# Patient Record
Sex: MALE | Race: WHITE | NOT HISPANIC OR LATINO | Employment: OTHER | URBAN - METROPOLITAN AREA
[De-identification: names, ages, dates, MRNs, and addresses within clinical notes are randomized per-mention and may not be internally consistent; named-entity substitution may affect disease eponyms.]

---

## 2017-07-27 DIAGNOSIS — E78.1 HYPERTRIGLYCERIDEMIA: ICD-10-CM

## 2017-07-27 DIAGNOSIS — E78.5 HYPERLIPIDEMIA: ICD-10-CM

## 2017-07-27 RX ORDER — SIMVASTATIN 80 MG/1
80 TABLET, FILM COATED ORAL NIGHTLY
Qty: 90 TABLET | Refills: 0 | Status: SHIPPED | OUTPATIENT
Start: 2017-07-27 | End: 2017-08-10 | Stop reason: SDUPTHER

## 2017-08-09 DIAGNOSIS — I10 ESSENTIAL HYPERTENSION: ICD-10-CM

## 2017-08-09 RX ORDER — ATENOLOL 50 MG/1
TABLET ORAL
Qty: 90 TABLET | OUTPATIENT
Start: 2017-08-09

## 2017-08-10 ENCOUNTER — OFFICE VISIT (OUTPATIENT)
Dept: FAMILY MEDICINE | Facility: CLINIC | Age: 52
End: 2017-08-10
Payer: COMMERCIAL

## 2017-08-10 ENCOUNTER — LAB VISIT (OUTPATIENT)
Dept: LAB | Facility: HOSPITAL | Age: 52
End: 2017-08-10
Attending: FAMILY MEDICINE
Payer: COMMERCIAL

## 2017-08-10 VITALS
HEART RATE: 75 BPM | SYSTOLIC BLOOD PRESSURE: 124 MMHG | BODY MASS INDEX: 31.02 KG/M2 | WEIGHT: 209.44 LBS | TEMPERATURE: 97 F | DIASTOLIC BLOOD PRESSURE: 78 MMHG | OXYGEN SATURATION: 98 % | HEIGHT: 69 IN

## 2017-08-10 DIAGNOSIS — I10 ESSENTIAL HYPERTENSION: ICD-10-CM

## 2017-08-10 DIAGNOSIS — Z00.00 ANNUAL PHYSICAL EXAM: Primary | ICD-10-CM

## 2017-08-10 DIAGNOSIS — E78.5 HYPERLIPIDEMIA, UNSPECIFIED HYPERLIPIDEMIA TYPE: ICD-10-CM

## 2017-08-10 DIAGNOSIS — E78.1 HYPERTRIGLYCERIDEMIA: ICD-10-CM

## 2017-08-10 DIAGNOSIS — Z00.00 ANNUAL PHYSICAL EXAM: ICD-10-CM

## 2017-08-10 DIAGNOSIS — G89.29 CHRONIC PAIN OF RIGHT KNEE: ICD-10-CM

## 2017-08-10 DIAGNOSIS — M25.561 CHRONIC PAIN OF RIGHT KNEE: ICD-10-CM

## 2017-08-10 PROBLEM — M25.562 CHRONIC PAIN OF LEFT KNEE: Status: ACTIVE | Noted: 2017-08-10

## 2017-08-10 LAB
ALBUMIN SERPL BCP-MCNC: 4.4 G/DL
ALP SERPL-CCNC: 70 U/L
ALT SERPL W/O P-5'-P-CCNC: 28 U/L
ANION GAP SERPL CALC-SCNC: 10 MMOL/L
AST SERPL-CCNC: 22 U/L
BASOPHILS # BLD AUTO: 0.04 K/UL
BASOPHILS NFR BLD: 0.7 %
BILIRUB SERPL-MCNC: 0.7 MG/DL
BUN SERPL-MCNC: 15 MG/DL
CALCIUM SERPL-MCNC: 10 MG/DL
CHLORIDE SERPL-SCNC: 108 MMOL/L
CHOLEST/HDLC SERPL: 3.9 {RATIO}
CO2 SERPL-SCNC: 25 MMOL/L
CREAT SERPL-MCNC: 1 MG/DL
CRP SERPL-MCNC: 0.7 MG/L
DIFFERENTIAL METHOD: ABNORMAL
EOSINOPHIL # BLD AUTO: 0.4 K/UL
EOSINOPHIL NFR BLD: 6.3 %
ERYTHROCYTE [DISTWIDTH] IN BLOOD BY AUTOMATED COUNT: 12.8 %
ERYTHROCYTE [SEDIMENTATION RATE] IN BLOOD BY WESTERGREN METHOD: 4 MM/HR
EST. GFR  (AFRICAN AMERICAN): >60 ML/MIN/1.73 M^2
EST. GFR  (NON AFRICAN AMERICAN): >60 ML/MIN/1.73 M^2
ESTIMATED AVG GLUCOSE: 120 MG/DL
GLUCOSE SERPL-MCNC: 123 MG/DL
HBA1C MFR BLD HPLC: 5.8 %
HCT VFR BLD AUTO: 45.1 %
HDL/CHOLESTEROL RATIO: 25.6 %
HDLC SERPL-MCNC: 156 MG/DL
HDLC SERPL-MCNC: 40 MG/DL
HGB BLD-MCNC: 15 G/DL
LDLC SERPL CALC-MCNC: 71.4 MG/DL
LYMPHOCYTES # BLD AUTO: 2 K/UL
LYMPHOCYTES NFR BLD: 35.3 %
MCH RBC QN AUTO: 31.6 PG
MCHC RBC AUTO-ENTMCNC: 33.3 G/DL
MCV RBC AUTO: 95 FL
MONOCYTES # BLD AUTO: 0.5 K/UL
MONOCYTES NFR BLD: 9.2 %
NEUTROPHILS # BLD AUTO: 2.7 K/UL
NEUTROPHILS NFR BLD: 48.1 %
NONHDLC SERPL-MCNC: 116 MG/DL
PLATELET # BLD AUTO: 251 K/UL
PMV BLD AUTO: 10.5 FL
POTASSIUM SERPL-SCNC: 5 MMOL/L
PROT SERPL-MCNC: 8.2 G/DL
RBC # BLD AUTO: 4.75 M/UL
SODIUM SERPL-SCNC: 143 MMOL/L
TRIGL SERPL-MCNC: 223 MG/DL
TSH SERPL DL<=0.005 MIU/L-ACNC: 2.9 UIU/ML
URATE SERPL-MCNC: 5.7 MG/DL
WBC # BLD AUTO: 5.53 K/UL

## 2017-08-10 PROCEDURE — 85651 RBC SED RATE NONAUTOMATED: CPT

## 2017-08-10 PROCEDURE — 99396 PREV VISIT EST AGE 40-64: CPT | Mod: S$GLB,,, | Performed by: FAMILY MEDICINE

## 2017-08-10 PROCEDURE — 85025 COMPLETE CBC W/AUTO DIFF WBC: CPT

## 2017-08-10 PROCEDURE — 84550 ASSAY OF BLOOD/URIC ACID: CPT

## 2017-08-10 PROCEDURE — 84443 ASSAY THYROID STIM HORMONE: CPT

## 2017-08-10 PROCEDURE — 84270 ASSAY OF SEX HORMONE GLOBUL: CPT

## 2017-08-10 PROCEDURE — 80053 COMPREHEN METABOLIC PANEL: CPT

## 2017-08-10 PROCEDURE — 83036 HEMOGLOBIN GLYCOSYLATED A1C: CPT

## 2017-08-10 PROCEDURE — 80061 LIPID PANEL: CPT

## 2017-08-10 PROCEDURE — 36415 COLL VENOUS BLD VENIPUNCTURE: CPT

## 2017-08-10 PROCEDURE — 99999 PR PBB SHADOW E&M-EST. PATIENT-LVL III: CPT | Mod: PBBFAC,,, | Performed by: FAMILY MEDICINE

## 2017-08-10 PROCEDURE — 86140 C-REACTIVE PROTEIN: CPT

## 2017-08-10 RX ORDER — ATENOLOL 50 MG/1
50 TABLET ORAL DAILY
Qty: 90 TABLET | Refills: 3 | Status: SHIPPED | OUTPATIENT
Start: 2017-08-10 | End: 2018-07-11 | Stop reason: SDUPTHER

## 2017-08-10 RX ORDER — DICLOFENAC SODIUM 10 MG/G
2 GEL TOPICAL 4 TIMES DAILY
Qty: 300 G | Refills: 3 | Status: SHIPPED | OUTPATIENT
Start: 2017-08-10 | End: 2019-12-12 | Stop reason: SDUPTHER

## 2017-08-10 RX ORDER — SIMVASTATIN 80 MG/1
80 TABLET, FILM COATED ORAL NIGHTLY
Qty: 90 TABLET | Refills: 3 | Status: SHIPPED | OUTPATIENT
Start: 2017-08-10 | End: 2018-01-25 | Stop reason: RX

## 2017-08-10 RX ORDER — MELOXICAM 7.5 MG/1
7.5 TABLET ORAL DAILY
Qty: 90 TABLET | Refills: 3 | Status: SHIPPED | OUTPATIENT
Start: 2017-08-10 | End: 2019-12-12 | Stop reason: SDUPTHER

## 2017-08-10 NOTE — PROGRESS NOTES
"Chief Complaint   Patient presents with    Annual Exam     SUBJECTIVE:   Familia Mccarthy is a 51 y.o. male presenting for his annual checkup.  Current Outpatient Prescriptions   Medication Sig Dispense Refill    atenolol (TENORMIN) 50 MG tablet Take 1 tablet (50 mg total) by mouth once daily. 90 tablet 3    simvastatin (ZOCOR) 80 MG tablet Take 1 tablet (80 mg total) by mouth nightly. *DO NOT TAKE WITH GRAPEFRUIT JUICE*-(last fill,needs appointment) 90 tablet 3     No current facility-administered medications for this visit.      Allergies: Review of patient's allergies indicates no known allergies.     ROS:  Feeling well. No dyspnea or chest pain on exertion. No abdominal pain, change in bowel habits, black or bloody stools. No urinary tract or prostatic symptoms. No neurological complaints.    OBJECTIVE:   The patient appears well, alert, oriented x 3, in no distress.   /78   Pulse 75   Temp 96.8 °F (36 °C) (Oral)   Ht 5' 9" (1.753 m)   Wt 95 kg (209 lb 7 oz)   SpO2 98%   BMI 30.93 kg/m²      Wt Readings from Last 15 Encounters:   08/10/17 95 kg (209 lb 7 oz)   07/13/16 94 kg (207 lb 3.7 oz)   06/18/15 93 kg (205 lb)   05/13/15 93 kg (205 lb)   11/19/14 93.4 kg (206 lb)   07/22/14 102.5 kg (226 lb)   07/07/14 102.5 kg (226 lb)   03/28/14 105.7 kg (233 lb)       ENT normal.  Neck supple. No adenopathy or thyromegaly. BILLY. Lungs are clear, good air entry, no wheezes, rhonchi or rales. S1 and S2 normal, no murmurs, regular rate and rhythm. Abdomen is soft without tenderness, guarding, mass or organomegaly.  exam: no penile lesions or discharge, no testicular masses or tenderness, no hernias.  Extremities show no edema, normal peripheral pulses. Neurological is normal without focal findings. Bilateral knees with chronic changes    ASSESSMENT:   1. Annual physical exam    2. Essential hypertension    3. Hypertriglyceridemia    4. Hyperlipidemia, unspecified hyperlipidemia type    5. Chronic pain " of right knee        PLAN:   Familia was seen today for annual exam.    Diagnoses and all orders for this visit:    Annual physical exam  -     CBC auto differential; Future  -     Comprehensive metabolic panel; Future  -     Hemoglobin A1c; Future  -     Lipid panel; Future  -     TSH; Future  -     Urinalysis; Future  -     Uric acid; Future  -     Sedimentation rate, manual; Future  -     C-reactive protein; Future  -     TESTOSTERONE PANEL; Future  Counseled on age appropriate medical preventative services, including age appropriate cancer screenings, over all nutritional health, need for a consistent exercise regimen and an over all push towards maintaining a vigorous and active lifestyle.  Counseled on age appropriate vaccines and discussed upcoming health care needs based on age/gender.  Spent time with patient counseling on need for a good patient/doctor relationship moving forward.  Discussed use of common OTC medications and supplements.  Discussed common dietary aids and use of caffeine and the need for good sleep hygiene and stress management.    Essential hypertension  -     atenolol (TENORMIN) 50 MG tablet; Take 1 tablet (50 mg total) by mouth once daily.  Potential medication side effects were discussed with the patient; let me know if any occur.    Hypertriglyceridemia  -     simvastatin (ZOCOR) 80 MG tablet; Take 1 tablet (80 mg total) by mouth nightly. *DO NOT TAKE WITH GRAPEFRUIT JUICE*-(last fill,needs appointment)    Hyperlipidemia, unspecified hyperlipidemia type  -     simvastatin (ZOCOR) 80 MG tablet; Take 1 tablet (80 mg total) by mouth nightly. *DO NOT TAKE WITH GRAPEFRUIT JUICE*-(last fill,needs appointment)    Chronic pain of right knee  -     meloxicam (MOBIC) 7.5 MG tablet; Take 1 tablet (7.5 mg total) by mouth once daily.  -     diclofenac sodium (VOLTAREN) 1 % Gel; Apply 2 g topically 4 (four) times daily.

## 2017-08-15 LAB
ALBUMIN SERPL-MCNC: 5.2 G/DL (ref 3.6–5.1)
SHBG SERPL-SCNC: 19 NMOL/L (ref 10–50)
TESTOST FREE SERPL-MCNC: 51.7 PG/ML (ref 46–224)
TESTOST SERPL-MCNC: 289 NG/DL (ref 250–1100)
TESTOSTERONE.FREE+WB SERPL-MCNC: 122.2 NG/DL (ref 110–575)

## 2017-09-22 ENCOUNTER — TELEPHONE (OUTPATIENT)
Dept: FAMILY MEDICINE | Facility: CLINIC | Age: 52
End: 2017-09-22

## 2017-09-22 NOTE — TELEPHONE ENCOUNTER
----- Message from Donaldo Duke MD sent at 8/15/2017 11:02 PM CDT -----  Schedule 6 month recall, thank you!

## 2018-01-24 ENCOUNTER — TELEPHONE (OUTPATIENT)
Dept: FAMILY MEDICINE | Facility: CLINIC | Age: 53
End: 2018-01-24

## 2018-01-24 NOTE — TELEPHONE ENCOUNTER
----- Message from Doroteo Regalado sent at 1/24/2018  9:29 AM CST -----  Contact: [facility]  States pt' s new insurance does not cover simvastatin (ZOCOR) 80 MG tablet but it will cover two (2) 40 MG. [facility] can be reached @ [phone].

## 2018-01-25 RX ORDER — SIMVASTATIN 40 MG/1
80 TABLET, FILM COATED ORAL NIGHTLY
Qty: 180 TABLET | Refills: 3 | Status: SHIPPED | OUTPATIENT
Start: 2018-01-25 | End: 2018-07-11 | Stop reason: SDUPTHER

## 2018-02-06 ENCOUNTER — TELEPHONE (OUTPATIENT)
Dept: FAMILY MEDICINE | Facility: CLINIC | Age: 53
End: 2018-02-06

## 2018-02-06 DIAGNOSIS — R79.89 LOW TESTOSTERONE IN MALE: ICD-10-CM

## 2018-02-06 DIAGNOSIS — E78.1 HYPERTRIGLYCERIDEMIA: ICD-10-CM

## 2018-02-06 DIAGNOSIS — R73.03 PREDIABETES: ICD-10-CM

## 2018-02-06 DIAGNOSIS — G89.29 CHRONIC PAIN OF LEFT KNEE: Primary | ICD-10-CM

## 2018-02-06 DIAGNOSIS — M25.562 CHRONIC PAIN OF LEFT KNEE: Primary | ICD-10-CM

## 2018-02-06 DIAGNOSIS — E78.5 HYPERLIPIDEMIA, UNSPECIFIED HYPERLIPIDEMIA TYPE: ICD-10-CM

## 2018-02-06 DIAGNOSIS — I10 HYPERTENSION, UNSPECIFIED TYPE: ICD-10-CM

## 2018-02-06 NOTE — TELEPHONE ENCOUNTER
----- Message from Antionette Dodd sent at 2/6/2018  7:59 AM CST -----  Contact: self  Patient requests to have blood work orders placed for upcoming appointment. He can be reached at [phone]. Thank you!

## 2018-03-09 ENCOUNTER — TELEPHONE (OUTPATIENT)
Dept: FAMILY MEDICINE | Facility: CLINIC | Age: 53
End: 2018-03-09

## 2018-03-09 NOTE — TELEPHONE ENCOUNTER
----- Message from Elizabeth Fischer sent at 3/9/2018  2:52 PM CST -----  Contact: Self [phone]  Patient is requesting a call back to verify if the doctor takes [facility].  From what I see on the list it is only accepted at [facility] location.  I advised patient but he wants to make sure prior to his coming for services starting on 3.16.18.    Patient may be reached at [phone].    Thank you.  LC

## 2018-07-11 DIAGNOSIS — I10 ESSENTIAL HYPERTENSION: ICD-10-CM

## 2018-07-11 RX ORDER — ATENOLOL 50 MG/1
50 TABLET ORAL DAILY
Qty: 90 TABLET | Refills: 3 | Status: SHIPPED | OUTPATIENT
Start: 2018-07-11 | End: 2019-07-15 | Stop reason: SDUPTHER

## 2018-07-11 RX ORDER — SIMVASTATIN 40 MG/1
80 TABLET, FILM COATED ORAL NIGHTLY
Qty: 180 TABLET | Refills: 3 | Status: SHIPPED | OUTPATIENT
Start: 2018-07-11 | End: 2019-02-28

## 2019-01-16 ENCOUNTER — LAB VISIT (OUTPATIENT)
Dept: LAB | Facility: HOSPITAL | Age: 54
End: 2019-01-16
Attending: FAMILY MEDICINE
Payer: COMMERCIAL

## 2019-01-16 DIAGNOSIS — G89.29 CHRONIC PAIN OF LEFT KNEE: ICD-10-CM

## 2019-01-16 DIAGNOSIS — E78.5 HYPERLIPIDEMIA, UNSPECIFIED HYPERLIPIDEMIA TYPE: ICD-10-CM

## 2019-01-16 DIAGNOSIS — M25.562 CHRONIC PAIN OF LEFT KNEE: ICD-10-CM

## 2019-01-16 DIAGNOSIS — R79.89 LOW TESTOSTERONE IN MALE: ICD-10-CM

## 2019-01-16 DIAGNOSIS — I10 HYPERTENSION, UNSPECIFIED TYPE: ICD-10-CM

## 2019-01-16 DIAGNOSIS — R73.03 PREDIABETES: ICD-10-CM

## 2019-01-16 DIAGNOSIS — E78.1 HYPERTRIGLYCERIDEMIA: ICD-10-CM

## 2019-01-16 LAB
ALBUMIN SERPL BCP-MCNC: 4.6 G/DL
ALP SERPL-CCNC: 71 U/L
ALT SERPL W/O P-5'-P-CCNC: 25 U/L
ANION GAP SERPL CALC-SCNC: 8 MMOL/L
AST SERPL-CCNC: 24 U/L
BASOPHILS # BLD AUTO: 0.05 K/UL
BASOPHILS NFR BLD: 0.7 %
BILIRUB SERPL-MCNC: 0.5 MG/DL
BUN SERPL-MCNC: 16 MG/DL
CALCIUM SERPL-MCNC: 9.8 MG/DL
CHLORIDE SERPL-SCNC: 106 MMOL/L
CHOLEST SERPL-MCNC: 137 MG/DL
CHOLEST/HDLC SERPL: 3.8 {RATIO}
CO2 SERPL-SCNC: 27 MMOL/L
CREAT SERPL-MCNC: 1.1 MG/DL
DIFFERENTIAL METHOD: ABNORMAL
EOSINOPHIL # BLD AUTO: 0.5 K/UL
EOSINOPHIL NFR BLD: 6.4 %
ERYTHROCYTE [DISTWIDTH] IN BLOOD BY AUTOMATED COUNT: 12.6 %
EST. GFR  (AFRICAN AMERICAN): >60 ML/MIN/1.73 M^2
EST. GFR  (NON AFRICAN AMERICAN): >60 ML/MIN/1.73 M^2
GLUCOSE SERPL-MCNC: 141 MG/DL
HCT VFR BLD AUTO: 44.4 %
HDLC SERPL-MCNC: 36 MG/DL
HDLC SERPL: 26.3 %
HGB BLD-MCNC: 15.1 G/DL
LDLC SERPL CALC-MCNC: 55.2 MG/DL
LYMPHOCYTES # BLD AUTO: 2.3 K/UL
LYMPHOCYTES NFR BLD: 31.1 %
MCH RBC QN AUTO: 31.8 PG
MCHC RBC AUTO-ENTMCNC: 34 G/DL
MCV RBC AUTO: 94 FL
MONOCYTES # BLD AUTO: 0.6 K/UL
MONOCYTES NFR BLD: 8.2 %
NEUTROPHILS # BLD AUTO: 4 K/UL
NEUTROPHILS NFR BLD: 53.6 %
NONHDLC SERPL-MCNC: 101 MG/DL
PLATELET # BLD AUTO: 237 K/UL
PMV BLD AUTO: 10.1 FL
POTASSIUM SERPL-SCNC: 4.8 MMOL/L
PROT SERPL-MCNC: 7.8 G/DL
RBC # BLD AUTO: 4.75 M/UL
SODIUM SERPL-SCNC: 141 MMOL/L
TRIGL SERPL-MCNC: 229 MG/DL
WBC # BLD AUTO: 7.46 K/UL

## 2019-01-16 PROCEDURE — 80061 LIPID PANEL: CPT

## 2019-01-16 PROCEDURE — 36415 COLL VENOUS BLD VENIPUNCTURE: CPT | Mod: PO

## 2019-01-16 PROCEDURE — 80053 COMPREHEN METABOLIC PANEL: CPT

## 2019-01-16 PROCEDURE — 82040 ASSAY OF SERUM ALBUMIN: CPT

## 2019-01-16 PROCEDURE — 85025 COMPLETE CBC W/AUTO DIFF WBC: CPT

## 2019-01-19 LAB
ALBUMIN SERPL-MCNC: 5.3 G/DL (ref 3.6–5.1)
SHBG SERPL-SCNC: 18 NMOL/L (ref 10–50)
TESTOST FREE SERPL-MCNC: 42.4 PG/ML (ref 46–224)
TESTOST SERPL-MCNC: 235 NG/DL (ref 250–1100)
TESTOSTERONE.FREE+WB SERPL-MCNC: 102.1 NG/DL (ref 110–575)

## 2019-02-01 ENCOUNTER — OFFICE VISIT (OUTPATIENT)
Dept: FAMILY MEDICINE | Facility: CLINIC | Age: 54
End: 2019-02-01
Payer: COMMERCIAL

## 2019-02-01 VITALS
OXYGEN SATURATION: 98 % | BODY MASS INDEX: 31.94 KG/M2 | DIASTOLIC BLOOD PRESSURE: 96 MMHG | HEIGHT: 69 IN | HEART RATE: 74 BPM | SYSTOLIC BLOOD PRESSURE: 130 MMHG | TEMPERATURE: 96 F | WEIGHT: 215.63 LBS

## 2019-02-01 DIAGNOSIS — Z00.00 ANNUAL PHYSICAL EXAM: Primary | ICD-10-CM

## 2019-02-01 DIAGNOSIS — R79.89 LOW TESTOSTERONE IN MALE: ICD-10-CM

## 2019-02-01 DIAGNOSIS — I10 HYPERTENSION, UNSPECIFIED TYPE: ICD-10-CM

## 2019-02-01 DIAGNOSIS — E78.1 HYPERTRIGLYCERIDEMIA: ICD-10-CM

## 2019-02-01 PROCEDURE — 99396 PREV VISIT EST AGE 40-64: CPT | Mod: S$GLB,,, | Performed by: FAMILY MEDICINE

## 2019-02-01 PROCEDURE — 3080F PR MOST RECENT DIASTOLIC BLOOD PRESSURE >= 90 MM HG: ICD-10-PCS | Mod: CPTII,S$GLB,, | Performed by: FAMILY MEDICINE

## 2019-02-01 PROCEDURE — 99999 PR PBB SHADOW E&M-EST. PATIENT-LVL III: ICD-10-PCS | Mod: PBBFAC,,, | Performed by: FAMILY MEDICINE

## 2019-02-01 PROCEDURE — 3075F SYST BP GE 130 - 139MM HG: CPT | Mod: CPTII,S$GLB,, | Performed by: FAMILY MEDICINE

## 2019-02-01 PROCEDURE — 99396 PR PREVENTIVE VISIT,EST,40-64: ICD-10-PCS | Mod: S$GLB,,, | Performed by: FAMILY MEDICINE

## 2019-02-01 PROCEDURE — 99999 PR PBB SHADOW E&M-EST. PATIENT-LVL III: CPT | Mod: PBBFAC,,, | Performed by: FAMILY MEDICINE

## 2019-02-01 PROCEDURE — 3080F DIAST BP >= 90 MM HG: CPT | Mod: CPTII,S$GLB,, | Performed by: FAMILY MEDICINE

## 2019-02-01 PROCEDURE — 3075F PR MOST RECENT SYSTOLIC BLOOD PRESS GE 130-139MM HG: ICD-10-PCS | Mod: CPTII,S$GLB,, | Performed by: FAMILY MEDICINE

## 2019-02-01 RX ORDER — TESTOSTERONE 20.25 MG/1.25G
20.25 GEL TOPICAL DAILY
Qty: 1 BOTTLE | Refills: 0 | Status: SHIPPED | OUTPATIENT
Start: 2019-02-01 | End: 2019-03-13 | Stop reason: SDUPTHER

## 2019-02-01 NOTE — PROGRESS NOTES
"Chief Complaint   Patient presents with    Results     SUBJECTIVE:   Familia Mccarthy is a 53 y.o. male presenting for his annual checkup.  Current Outpatient Medications   Medication Sig Dispense Refill    atenolol (TENORMIN) 50 MG tablet Take 1 tablet (50 mg total) by mouth once daily. 90 tablet 3    simvastatin (ZOCOR) 40 MG tablet Take 2 tablets (80 mg total) by mouth every evening. 180 tablet 3    diclofenac sodium (VOLTAREN) 1 % Gel Apply 2 g topically 4 (four) times daily. 300 g 3    meloxicam (MOBIC) 7.5 MG tablet Take 1 tablet (7.5 mg total) by mouth once daily. 90 tablet 3     No current facility-administered medications for this visit.      Allergies: Patient has no known allergies.     ROS:  Feeling well. No dyspnea or chest pain on exertion. No abdominal pain, change in bowel habits, black or bloody stools. No urinary tract or prostatic symptoms. No neurological complaints.    OBJECTIVE:   The patient appears well, alert, oriented x 3, in no distress.   BP (!) 130/96   Pulse 74   Temp 96.1 °F (35.6 °C) (Oral)   Ht 5' 9" (1.753 m)   Wt 97.8 kg (215 lb 9.8 oz)   SpO2 98%   BMI 31.84 kg/m²   ENT normal.  Neck supple. No adenopathy or thyromegaly. BILLY. Lungs are clear, good air entry, no wheezes, rhonchi or rales. S1 and S2 normal, no murmurs, regular rate and rhythm. Abdomen is soft without tenderness, guarding, mass or organomegaly.  exam: deferred.  Extremities show no edema, normal peripheral pulses. Neurological is normal without focal findings.    ASSESSMENT:   1. Annual physical exam    2. Low testosterone in male    3. Hypertriglyceridemia    4. Hypertension, unspecified type        PLAN:   Familia was seen today for results.    Diagnoses and all orders for this visit:    Annual physical exam  -     TSH; Future  -     Hemoglobin A1c; Future  -     PSA, Screening; Future    Low testosterone in male  -     Testosterone; Future    Hypertriglyceridemia    Hypertension, unspecified " type      Counseled on age appropriate medical preventative services, including age appropriate cancer screenings, over all nutritional health, need for a consistent exercise regimen and an over all push towards maintaining a vigorous and active lifestyle.  Counseled on age appropriate vaccines and discussed upcoming health care needs based on age/gender.  Spent time with patient counseling on need for a good patient/doctor relationship moving forward.  Discussed use of common OTC medications and supplements.  Discussed common dietary aids and use of caffeine and the need for good sleep hygiene and stress management.

## 2019-02-08 DIAGNOSIS — R79.89 LOW TESTOSTERONE IN MALE: ICD-10-CM

## 2019-02-08 NOTE — TELEPHONE ENCOUNTER
----- Message from Raya Cowan sent at 2/7/2019  9:42 AM CST -----  Contact: [facility]  Have additional questions for request for patient's medication.       testosterone (ANDROGEL) 20.25 mg/1.25 gram (1.62 %) GlPm        [facility] [phone] Case ref# [id number]

## 2019-02-09 RX ORDER — TESTOSTERONE 20.25 MG/1.25G
20.25 GEL TOPICAL DAILY
OUTPATIENT
Start: 2019-02-09

## 2019-02-12 ENCOUNTER — TELEPHONE (OUTPATIENT)
Dept: FAMILY MEDICINE | Facility: CLINIC | Age: 54
End: 2019-02-12

## 2019-02-12 NOTE — TELEPHONE ENCOUNTER
"----- Message from Laurita Larose sent at 2/12/2019 12:24 PM CST -----  Contact: Abisai Champion/ [phone]  Abisai asking office to send in "compounded form" similar to testosterone RX. Please call to advise. Thank you.  "

## 2019-02-12 NOTE — TELEPHONE ENCOUNTER
----- Message from Laury Vázquez sent at 2/12/2019 10:52 AM CST -----  Contact: [facility]  [phone]  refer  [id number]  [facility] requesting to speak to you regarding a appeal on  Testerone gel. [facility]s call [facility] [phone]   Refer # [id number]. Thanks...........Minda

## 2019-02-13 NOTE — TELEPHONE ENCOUNTER
Spoke with Jeannie @ [facility] informed her to Cx PA,  spoke with Abisai@ at [place] and called something else in.

## 2019-02-13 NOTE — TELEPHONE ENCOUNTER
----- Message from Raya Cowan sent at 2/13/2019 11:13 AM CST -----  Contact: [facility]   [facility] with [facility] has questions regarding patient's PA she will fax them over . They will be listed on the cover sheet.     testosterone (ANDROGEL) 20.25 mg/1.25 gram (1.62 %) [facility]        [phone] Case ref# [id number]

## 2019-02-28 RX ORDER — SIMVASTATIN 80 MG/1
80 TABLET, FILM COATED ORAL NIGHTLY
Qty: 90 TABLET | Refills: 3 | Status: SHIPPED | OUTPATIENT
Start: 2019-02-28 | End: 2019-12-12 | Stop reason: SDUPTHER

## 2019-03-11 ENCOUNTER — CLINICAL SUPPORT (OUTPATIENT)
Dept: FAMILY MEDICINE | Facility: CLINIC | Age: 54
End: 2019-03-11
Payer: COMMERCIAL

## 2019-03-11 ENCOUNTER — LAB VISIT (OUTPATIENT)
Dept: LAB | Facility: HOSPITAL | Age: 54
End: 2019-03-11
Attending: FAMILY MEDICINE
Payer: COMMERCIAL

## 2019-03-11 VITALS — DIASTOLIC BLOOD PRESSURE: 100 MMHG | SYSTOLIC BLOOD PRESSURE: 140 MMHG

## 2019-03-11 DIAGNOSIS — Z00.00 ANNUAL PHYSICAL EXAM: ICD-10-CM

## 2019-03-11 DIAGNOSIS — R79.89 LOW TESTOSTERONE IN MALE: ICD-10-CM

## 2019-03-11 DIAGNOSIS — I10 HYPERTENSION, UNSPECIFIED TYPE: Primary | ICD-10-CM

## 2019-03-11 LAB
T4 FREE SERPL-MCNC: 0.74 NG/DL
TSH SERPL DL<=0.005 MIU/L-ACNC: 4.11 UIU/ML

## 2019-03-11 PROCEDURE — 84439 ASSAY OF FREE THYROXINE: CPT

## 2019-03-11 PROCEDURE — 99499 NO LOS: ICD-10-PCS | Mod: S$GLB,,, | Performed by: FAMILY MEDICINE

## 2019-03-11 PROCEDURE — 84153 ASSAY OF PSA TOTAL: CPT

## 2019-03-11 PROCEDURE — 36415 COLL VENOUS BLD VENIPUNCTURE: CPT | Mod: PO

## 2019-03-11 PROCEDURE — 83036 HEMOGLOBIN GLYCOSYLATED A1C: CPT

## 2019-03-11 PROCEDURE — 84403 ASSAY OF TOTAL TESTOSTERONE: CPT

## 2019-03-11 PROCEDURE — 99499 UNLISTED E&M SERVICE: CPT | Mod: S$GLB,,, | Performed by: FAMILY MEDICINE

## 2019-03-11 PROCEDURE — 84443 ASSAY THYROID STIM HORMONE: CPT

## 2019-03-11 NOTE — PROGRESS NOTES
Familia Mccarthy 53 y.o. male is here today for Blood Pressure check.   History of HTN yes.    Review of patient's allergies indicates:  No Known Allergies  Creatinine   Date Value Ref Range Status   01/16/2019 1.1 0.5 - 1.4 mg/dL Final     Sodium   Date Value Ref Range Status   01/16/2019 141 136 - 145 mmol/L Final     Potassium   Date Value Ref Range Status   01/16/2019 4.8 3.5 - 5.1 mmol/L Final   ]  Patient verifies taking blood pressure medications on a regular basis at the same time of the day.     Current Outpatient Medications:     atenolol (TENORMIN) 50 MG tablet, Take 1 tablet (50 mg total) by mouth once daily., Disp: 90 tablet, Rfl: 3    diclofenac sodium (VOLTAREN) 1 % Gel, Apply 2 g topically 4 (four) times daily., Disp: 300 g, Rfl: 3    meloxicam (MOBIC) 7.5 MG tablet, Take 1 tablet (7.5 mg total) by mouth once daily., Disp: 90 tablet, Rfl: 3    simvastatin (ZOCOR) 80 MG tablet, Take 1 tablet (80 mg total) by mouth every evening., Disp: 90 tablet, Rfl: 3    testosterone (ANDROGEL) 20.25 mg/1.25 gram (1.62 %) GlPm, Place 20.25 mg onto the skin once daily., Disp: 1 Bottle, Rfl: 0  Does patient have record of home blood pressure readings no. Readings have been averaging  - no readings.   Last dose of blood pressure medication was taken at 9:00 am  3/11/2019.  Patient is asymptomatic.   Complains of  - no complaints.      140/100 right arm, sitting, manual    Dr. Duke informed of nurse visit. Patient advised, per Dr. Duke, to return for BP check.  Patient verbalized understanding.

## 2019-03-12 LAB
COMPLEXED PSA SERPL-MCNC: 1.1 NG/ML
ESTIMATED AVG GLUCOSE: 131 MG/DL
HBA1C MFR BLD HPLC: 6.2 %
TESTOST SERPL-MCNC: 160 NG/DL

## 2019-03-13 DIAGNOSIS — R79.89 LOW TESTOSTERONE IN MALE: ICD-10-CM

## 2019-03-13 RX ORDER — TESTOSTERONE 20.25 MG/1.25G
GEL TOPICAL
Qty: 2 BOTTLE | Refills: 1 | Status: SHIPPED | OUTPATIENT
Start: 2019-03-13 | End: 2019-08-09 | Stop reason: SDUPTHER

## 2019-05-17 ENCOUNTER — PATIENT OUTREACH (OUTPATIENT)
Dept: ADMINISTRATIVE | Facility: HOSPITAL | Age: 54
End: 2019-05-17

## 2019-05-30 ENCOUNTER — LAB VISIT (OUTPATIENT)
Dept: LAB | Facility: HOSPITAL | Age: 54
End: 2019-05-30
Attending: FAMILY MEDICINE
Payer: COMMERCIAL

## 2019-05-30 DIAGNOSIS — R79.89 LOW TESTOSTERONE IN MALE: Primary | ICD-10-CM

## 2019-05-30 DIAGNOSIS — R79.89 LOW TESTOSTERONE IN MALE: ICD-10-CM

## 2019-05-30 LAB — TESTOST SERPL-MCNC: 120 NG/DL (ref 304–1227)

## 2019-05-30 PROCEDURE — 84403 ASSAY OF TOTAL TESTOSTERONE: CPT

## 2019-06-03 ENCOUNTER — OFFICE VISIT (OUTPATIENT)
Dept: FAMILY MEDICINE | Facility: CLINIC | Age: 54
End: 2019-06-03
Payer: COMMERCIAL

## 2019-06-03 VITALS
DIASTOLIC BLOOD PRESSURE: 86 MMHG | HEART RATE: 92 BPM | SYSTOLIC BLOOD PRESSURE: 138 MMHG | HEIGHT: 69 IN | BODY MASS INDEX: 31.47 KG/M2 | WEIGHT: 212.5 LBS | TEMPERATURE: 98 F | OXYGEN SATURATION: 98 %

## 2019-06-03 DIAGNOSIS — M79.10 MYALGIA: ICD-10-CM

## 2019-06-03 DIAGNOSIS — I10 HYPERTENSION, UNSPECIFIED TYPE: ICD-10-CM

## 2019-06-03 DIAGNOSIS — R79.89 LOW TESTOSTERONE IN MALE: Primary | ICD-10-CM

## 2019-06-03 DIAGNOSIS — R35.1 NOCTURIA: ICD-10-CM

## 2019-06-03 PROCEDURE — 99999 PR PBB SHADOW E&M-EST. PATIENT-LVL III: CPT | Mod: PBBFAC,,, | Performed by: FAMILY MEDICINE

## 2019-06-03 PROCEDURE — 3079F PR MOST RECENT DIASTOLIC BLOOD PRESSURE 80-89 MM HG: ICD-10-PCS | Mod: CPTII,S$GLB,, | Performed by: FAMILY MEDICINE

## 2019-06-03 PROCEDURE — 3008F PR BODY MASS INDEX (BMI) DOCUMENTED: ICD-10-PCS | Mod: CPTII,S$GLB,, | Performed by: FAMILY MEDICINE

## 2019-06-03 PROCEDURE — 99214 OFFICE O/P EST MOD 30 MIN: CPT | Mod: S$GLB,,, | Performed by: FAMILY MEDICINE

## 2019-06-03 PROCEDURE — 99999 PR PBB SHADOW E&M-EST. PATIENT-LVL III: ICD-10-PCS | Mod: PBBFAC,,, | Performed by: FAMILY MEDICINE

## 2019-06-03 PROCEDURE — 3075F SYST BP GE 130 - 139MM HG: CPT | Mod: CPTII,S$GLB,, | Performed by: FAMILY MEDICINE

## 2019-06-03 PROCEDURE — 99214 PR OFFICE/OUTPT VISIT, EST, LEVL IV, 30-39 MIN: ICD-10-PCS | Mod: S$GLB,,, | Performed by: FAMILY MEDICINE

## 2019-06-03 PROCEDURE — 3008F BODY MASS INDEX DOCD: CPT | Mod: CPTII,S$GLB,, | Performed by: FAMILY MEDICINE

## 2019-06-03 PROCEDURE — 3075F PR MOST RECENT SYSTOLIC BLOOD PRESS GE 130-139MM HG: ICD-10-PCS | Mod: CPTII,S$GLB,, | Performed by: FAMILY MEDICINE

## 2019-06-03 PROCEDURE — 3079F DIAST BP 80-89 MM HG: CPT | Mod: CPTII,S$GLB,, | Performed by: FAMILY MEDICINE

## 2019-06-03 RX ORDER — TIZANIDINE 4 MG/1
4 TABLET ORAL NIGHTLY PRN
Qty: 30 TABLET | Refills: 0 | Status: SHIPPED | OUTPATIENT
Start: 2019-06-03 | End: 2019-07-03 | Stop reason: SDUPTHER

## 2019-06-03 NOTE — PROGRESS NOTES
Chief Complaint   Patient presents with    Hypertension     follow up        SUBJECTIVE:  Familia Mccarthy is a 53 y.o. male here for new problem of fatigue in the afternoon, pushes himself to be active and borderline HTN and on atenolol.  He is doing ok with that but was on Testosterone topical and his numbers and feeling are about the same on the compounded cream/gel.  Given his lack of response will see about sleeping better with chronic myalgia and seeing specialist for nocturia.  Currently has co-morbidities including per problem list.      Past Medical History:   Diagnosis Date    Hyperlipidemia     Hypertension      Past Surgical History:   Procedure Laterality Date    COLONOSCOPY N/A 6/18/2015    Performed [facility] at [facility]    KNEE SURGERY      bilateral     Social History     Socioeconomic History    Marital status:      Spouse name: Not on file    Number of children: Not on file    Years of education: Not on file    Highest education level: Not on file   Occupational History    Not on file   Social Needs    Financial resource strain: Not on file    Food insecurity:     Worry: Not on file     Inability: Not on file    Transportation needs:     Medical: Not on file     Non-medical: Not on file   Tobacco Use    Smoking status: Never Smoker    Smokeless tobacco: Never Used   Substance and Sexual Activity    Alcohol use: Yes     Comment: social    Drug use: No    Sexual activity: Yes     Partners: Female   Lifestyle    Physical activity:     Days per week: Not on file     Minutes per session: Not on file    Stress: Not on file   Relationships    Social connections:     Talks on phone: Not on file     Gets together: Not on file     Attends Baptism service: Not on file     Active member of club or organization: Not on file     Attends meetings of clubs or organizations: Not on file     Relationship status: Not on file   Other Topics Concern    Not on file  "  Social History Narrative    Not on file     Family History   Problem Relation Age of Onset    Cancer Mother     Heart disease Mother     Diabetes Brother      Current Outpatient Medications on File Prior to Visit   Medication Sig Dispense Refill    atenolol (TENORMIN) 50 MG tablet Take 1 tablet (50 mg total) by mouth once daily. 90 tablet 3    diclofenac sodium (VOLTAREN) 1 % Gel Apply 2 g topically 4 (four) times daily. 300 g 3    meloxicam (MOBIC) 7.5 MG tablet Take 1 tablet (7.5 mg total) by mouth once daily. 90 tablet 3    simvastatin (ZOCOR) 80 MG tablet Take 1 tablet (80 mg total) by mouth every evening. 90 tablet 3    testosterone (ANDROGEL) 20.25 mg/1.25 gram (1.62 %) GlPm 75 mg daily to the skin 2 Bottle 1     No current facility-administered medications on file prior to visit.      Review of patient's allergies indicates:  No Known Allergies      Review of Systems   Constitutional: Positive for malaise/fatigue (more in the evening). Negative for chills and weight loss.   HENT: Negative.    Eyes: Negative.    Respiratory: Negative.    Cardiovascular: Negative.    Gastrointestinal: Negative.    Genitourinary: Negative.    Musculoskeletal: Positive for myalgias.   Skin: Negative.    Neurological: Negative.    Endo/Heme/Allergies: Negative.    Psychiatric/Behavioral: Negative for depression. The patient has insomnia.        OBJECTIVE:  /86   Pulse 92   Temp 97.5 °F (36.4 °C) (Oral)   Ht 5' 9" (1.753 m)   Wt 96.4 kg (212 lb 8.4 oz)   SpO2 98%   BMI 31.38 kg/m²     Wt Readings from Last 3 Encounters:   06/03/19 96.4 kg (212 lb 8.4 oz)   02/01/19 97.8 kg (215 lb 9.8 oz)   08/10/17 95 kg (209 lb 7 oz)     BP Readings from Last 3 Encounters:   06/03/19 138/86   03/11/19 (!) 140/100   02/01/19 (!) 130/96       He appears well, in no apparent distress.  Alert and oriented times three, pleasant and cooperative. Vital signs are as documented in vital signs section.  S1 and S2 normal, no murmurs, " clicks, gallops or rubs. Regular rate and rhythm. Chest is clear; no wheezes or rales. No edema or JVD.  Deferred  exam    Review of old Records:  Reviewed     Review of old labs:  Lab Results   Component Value Date    TSH 4.113 (H) 03/11/2019     Lab Results   Component Value Date    WBC 7.46 01/16/2019    HGB 15.1 01/16/2019    HCT 44.4 01/16/2019    MCV 94 01/16/2019     01/16/2019       Chemistry        Component Value Date/Time     01/16/2019 0815    K 4.8 01/16/2019 0815     01/16/2019 0815    CO2 27 01/16/2019 0815    BUN 16 01/16/2019 0815    CREATININE 1.1 01/16/2019 0815     (H) 01/16/2019 0815        Component Value Date/Time    CALCIUM 9.8 01/16/2019 0815    ALKPHOS 71 01/16/2019 0815    AST 24 01/16/2019 0815    ALT 25 01/16/2019 0815    BILITOT 0.5 01/16/2019 0815    ESTGFRAFRICA >60 01/16/2019 0815    EGFRNONAA >60 01/16/2019 0815        Lab Results   Component Value Date    CHOL 137 01/16/2019    CHOL 156 08/10/2017    CHOL 128 07/13/2016     Lab Results   Component Value Date    HDL 36 (L) 01/16/2019    HDL 40 08/10/2017    HDL 41 07/13/2016     Lab Results   Component Value Date    LDLCALC 55.2 (L) 01/16/2019    LDLCALC 71.4 08/10/2017    LDLCALC 57.4 (L) 07/13/2016     Lab Results   Component Value Date    TRIG 229 (H) 01/16/2019    TRIG 223 (H) 08/10/2017    TRIG 148 07/13/2016     Lab Results   Component Value Date    CHOLHDL 26.3 01/16/2019    CHOLHDL 25.6 08/10/2017    CHOLHDL 32.0 07/13/2016         Review of old imaging:  Lab Results   Component Value Date    TESTOSTERONE 235 (L) 01/16/2019    TESTOSTERONE 42.4 (L) 01/16/2019         ASSESSMENT:  Problem List Items Addressed This Visit     Low testosterone in male - Primary    Relevant Orders    Ambulatory referral to Urology      Other Visit Diagnoses     Nocturia        Relevant Orders    Ambulatory referral to Urology    Myalgia        Relevant Medications    tiZANidine (ZANAFLEX) 4 MG tablet           ICD-10-CM ICD-9-CM   1. Low testosterone in male R79.89 790.99   2. Nocturia R35.1 788.43   3. Myalgia M79.10 729.1         PLAN:  1. Low testosterone in male  See about pellet therapy or maybe IM injections to see if he gets imprvement  Monitor b/p  - Ambulatory referral to Urology    2. Nocturia    - Ambulatory referral to Urology    3. Myalgia  Potential medication side effects were discussed with the patient; let me know if any occur.    - tiZANidine (ZANAFLEX) 4 MG tablet; Take 1 tablet (4 mg total) by mouth nightly as needed.  Dispense: 30 tablet; Refill: 0      Medication List with Changes/Refills   New Medications    TIZANIDINE (ZANAFLEX) 4 MG TABLET    Take 1 tablet (4 mg total) by mouth nightly as needed.   Current Medications    ATENOLOL (TENORMIN) 50 MG TABLET    Take 1 tablet (50 mg total) by mouth once daily.    DICLOFENAC SODIUM (VOLTAREN) 1 % GEL    Apply 2 g topically 4 (four) times daily.    MELOXICAM (MOBIC) 7.5 MG TABLET    Take 1 tablet (7.5 mg total) by mouth once daily.    SIMVASTATIN (ZOCOR) 80 MG TABLET    Take 1 tablet (80 mg total) by mouth every evening.    TESTOSTERONE (ANDROGEL) 20.25 MG/1.25 GRAM (1.62 %) GLPM    75 mg daily to the skin     Hold on tdap  No follow-ups on file.

## 2019-06-05 DIAGNOSIS — G89.29 CHRONIC MUSCULOSKELETAL PAIN: ICD-10-CM

## 2019-06-05 DIAGNOSIS — M79.18 CHRONIC MUSCULOSKELETAL PAIN: ICD-10-CM

## 2019-06-05 DIAGNOSIS — F32.A DEPRESSION, UNSPECIFIED DEPRESSION TYPE: Primary | ICD-10-CM

## 2019-06-05 RX ORDER — DULOXETIN HYDROCHLORIDE 20 MG/1
20 CAPSULE, DELAYED RELEASE ORAL DAILY
Qty: 30 CAPSULE | Refills: 0 | Status: SHIPPED | OUTPATIENT
Start: 2019-06-05 | End: 2019-07-03 | Stop reason: SDUPTHER

## 2019-06-29 ENCOUNTER — PATIENT OUTREACH (OUTPATIENT)
Dept: ADMINISTRATIVE | Facility: OTHER | Age: 54
End: 2019-06-29

## 2019-07-02 ENCOUNTER — OFFICE VISIT (OUTPATIENT)
Dept: UROLOGY | Facility: CLINIC | Age: 54
End: 2019-07-02
Payer: COMMERCIAL

## 2019-07-02 VITALS
WEIGHT: 206.56 LBS | BODY MASS INDEX: 30.59 KG/M2 | SYSTOLIC BLOOD PRESSURE: 132 MMHG | DIASTOLIC BLOOD PRESSURE: 72 MMHG | HEIGHT: 69 IN

## 2019-07-02 DIAGNOSIS — R35.1 NOCTURIA MORE THAN TWICE PER NIGHT: ICD-10-CM

## 2019-07-02 DIAGNOSIS — N13.8 BPH WITH OBSTRUCTION/LOWER URINARY TRACT SYMPTOMS: ICD-10-CM

## 2019-07-02 DIAGNOSIS — N40.1 BPH WITH OBSTRUCTION/LOWER URINARY TRACT SYMPTOMS: ICD-10-CM

## 2019-07-02 DIAGNOSIS — E29.1 HYPOGONADISM MALE: Primary | ICD-10-CM

## 2019-07-02 PROCEDURE — 99999 PR PBB SHADOW E&M-EST. PATIENT-LVL III: ICD-10-PCS | Mod: PBBFAC,,, | Performed by: UROLOGY

## 2019-07-02 PROCEDURE — 81001 PR  URINALYSIS, AUTO, W/SCOPE: ICD-10-PCS | Mod: S$GLB,,, | Performed by: UROLOGY

## 2019-07-02 PROCEDURE — 3008F BODY MASS INDEX DOCD: CPT | Mod: CPTII,S$GLB,, | Performed by: UROLOGY

## 2019-07-02 PROCEDURE — 99204 OFFICE O/P NEW MOD 45 MIN: CPT | Mod: 25,S$GLB,, | Performed by: UROLOGY

## 2019-07-02 PROCEDURE — 3075F PR MOST RECENT SYSTOLIC BLOOD PRESS GE 130-139MM HG: ICD-10-PCS | Mod: CPTII,S$GLB,, | Performed by: UROLOGY

## 2019-07-02 PROCEDURE — 3008F PR BODY MASS INDEX (BMI) DOCUMENTED: ICD-10-PCS | Mod: CPTII,S$GLB,, | Performed by: UROLOGY

## 2019-07-02 PROCEDURE — 3078F DIAST BP <80 MM HG: CPT | Mod: CPTII,S$GLB,, | Performed by: UROLOGY

## 2019-07-02 PROCEDURE — 3075F SYST BP GE 130 - 139MM HG: CPT | Mod: CPTII,S$GLB,, | Performed by: UROLOGY

## 2019-07-02 PROCEDURE — 3078F PR MOST RECENT DIASTOLIC BLOOD PRESSURE < 80 MM HG: ICD-10-PCS | Mod: CPTII,S$GLB,, | Performed by: UROLOGY

## 2019-07-02 PROCEDURE — 99204 PR OFFICE/OUTPT VISIT, NEW, LEVL IV, 45-59 MIN: ICD-10-PCS | Mod: 25,S$GLB,, | Performed by: UROLOGY

## 2019-07-02 PROCEDURE — 99999 PR PBB SHADOW E&M-EST. PATIENT-LVL III: CPT | Mod: PBBFAC,,, | Performed by: UROLOGY

## 2019-07-02 PROCEDURE — 81001 URINALYSIS AUTO W/SCOPE: CPT | Mod: S$GLB,,, | Performed by: UROLOGY

## 2019-07-02 RX ORDER — CALCIUM CARBONATE 600 MG
2000 TABLET ORAL ONCE
COMMUNITY

## 2019-07-02 RX ORDER — TESTOSTERONE CYPIONATE 200 MG/ML
400 INJECTION, SOLUTION INTRAMUSCULAR
Status: DISCONTINUED | OUTPATIENT
Start: 2019-07-02 | End: 2019-12-12

## 2019-07-02 RX ORDER — POTASSIUM CHLORIDE 20 MEQ/1
99 TABLET, EXTENDED RELEASE ORAL 2 TIMES DAILY
COMMUNITY

## 2019-07-02 RX ORDER — AMOXICILLIN 500 MG
CAPSULE ORAL DAILY
COMMUNITY

## 2019-07-02 RX ORDER — LANOLIN ALCOHOL/MO/W.PET/CERES
5000 CREAM (GRAM) TOPICAL DAILY
COMMUNITY

## 2019-07-02 RX ORDER — IBUPROFEN 100 MG/5ML
1000 SUSPENSION, ORAL (FINAL DOSE FORM) ORAL DAILY
COMMUNITY

## 2019-07-02 NOTE — LETTER
July 2, 2019      Donaldo Duke MD  [address]  [place] [address]           [facility]  [address] [facility] [address]  [place] [address]  Phone: [phone]  Fax: [phone]          Patient: Familia Mccarthy   MR Number: [id number]   YOB: 1965   Date of Visit: 7/2/2019       Dear Dr. Donaldo Duke:    Thank you for referring Familia Mccarthy to me for evaluation. Attached you will find relevant portions of my assessment and plan of care.    If you have questions, please do not hesitate to call me. I look forward to following Familia Mccarthy along with you.    Sincerely,    JAKE Russ MD    Enclosure  CC:  No Recipients    If you would like to receive this communication electronically, please contact [email or web address] or [phone] to request more information on [employer] Link access.    For providers and/or their staff who would like to refer a patient to [facility], please contact us through our one-stop-shop provider referral line, [facility], at [phone].    If you feel you have received this communication in error or would no longer like to receive these types of communications, please e-mail [email or web address]

## 2019-07-02 NOTE — PROGRESS NOTES
Subjective:       Patient ID: Familia Mccarthy is a 53 y.o. male who was referred by Donaldo Duke MD    Chief Complaint:   Chief Complaint   Patient presents with    Low Testosterone     new pt coming in for low testosterone        Hypogonadism    He is here today to discuss hypogonadism.  His symptoms include: Poor energy, Fatigue, Increased Body Fat and Gynecomastia.   Onset of symptoms was several years ago and was gradual in onset. He is bothered by these symptoms.  Risk factors include: Hypertension.  Previous treatments include Androgel, but this has not been helpful.    Benign Prostatic Hyperplasia  He patient reports nocturia two times a night. He denies straining, urgency and weak stream. The patient states symptoms are of mild severity. Onset of symptoms was several years ago and was gradual in onset.  He has no personal history and no family history of prostate cancer. He reports a history of no complicating symptoms. He denies flank pain, gross hematuria, kidney stones and recurrent UTI.  He is currently taking no prostate medications.        ACTIVE MEDICAL ISSUES:  Patient Active Problem List   Diagnosis    Hypertension    Hyperlipidemia    Hypertriglyceridemia    Prediabetes    Chronic pain of left knee and right knee, with past surgeries    Low testosterone in male       PAST MEDICAL HISTORY  Past Medical History:   Diagnosis Date    Hyperlipidemia     Hypertension        PAST SURGICAL HISTORY:  Past Surgical History:   Procedure Laterality Date    COLONOSCOPY N/A 6/18/2015    Performed [facility]    KNEE SURGERY      bilateral       SOCIAL HISTORY:  Social History     Tobacco Use    Smoking status: Never Smoker    Smokeless tobacco: Never Used   Substance Use Topics    Alcohol use: Yes     Comment: social    Drug use: No       FAMILY HISTORY:  Family History   Problem Relation Age of Onset    Cancer Mother     Heart disease Mother     Diabetes Brother         ALLERGIES AND MEDICATIONS: updated and reviewed.  Review of patient's allergies indicates:  No Known Allergies  Current Outpatient Medications   Medication Sig    ascorbic acid, vitamin C, (VITAMIN C) 1000 MG tablet Take 1,000 mg by mouth once daily.    atenolol (TENORMIN) 50 MG tablet Take 1 tablet (50 mg total) by mouth once daily.    calcium carbonate (OS-MELISSA) 600 mg calcium (1,500 mg) Tab Take 2,000 mg by mouth once.    cyanocobalamin (VITAMIN B-12) 1000 MCG tablet Take 5,000 mcg by mouth once daily.    DULoxetine (CYMBALTA) 20 MG capsule Take 1 capsule (20 mg total) by mouth once daily.    fish oil-omega-3 fatty acids 300-1,000 mg capsule Take by mouth once daily.    meloxicam (MOBIC) 7.5 MG tablet Take 1 tablet (7.5 mg total) by mouth once daily.    multivit-min/FA/lycopen/lutein (CENTRUM SILVER MEN ORAL) Take by mouth.    potassium chloride SA (K-DUR,KLOR-CON) 20 MEQ tablet Take 99 mEq by mouth 2 (two) times daily.    simvastatin (ZOCOR) 80 MG tablet Take 1 tablet (80 mg total) by mouth every evening.    testosterone (ANDROGEL) 20.25 mg/1.25 gram (1.62 %) GlPm 75 mg daily to the skin    diclofenac sodium (VOLTAREN) 1 % Gel Apply 2 g topically 4 (four) times daily.     Current Facility-Administered Medications   Medication    testosterone cypionate injection 400 mg       Review of Systems   Constitutional: Negative for activity change, fatigue, fever and unexpected weight change.   HENT: Negative for congestion.    Eyes: Negative for redness.   Respiratory: Negative for chest tightness and shortness of breath.    Cardiovascular: Negative for chest pain and leg swelling.   Gastrointestinal: Negative for abdominal pain, constipation, diarrhea, nausea and vomiting.   Genitourinary: Negative for dysuria, flank pain, frequency, hematuria, penile pain, penile swelling, scrotal swelling, testicular pain and urgency.   Musculoskeletal: Negative for arthralgias and back pain.   Neurological: Negative  "for dizziness and light-headedness.   Psychiatric/Behavioral: Negative for behavioral problems and confusion. The patient is not nervous/anxious.    All other systems reviewed and are negative.      Objective:      Vitals:    07/02/19 0918   BP: 132/72   BP Location: Right arm   Patient Position: Sitting   BP Method: Large (Manual)   Weight: 93.7 kg (206 lb 9.1 oz)   Height: 5' 9" (1.753 m)     Physical Exam   Nursing note and vitals reviewed.  Constitutional: He is oriented to person, place, and time. He appears well-developed and well-nourished.   HENT:   Head: Normocephalic.   Eyes: Conjunctivae are normal.   Neck: Normal range of motion. No tracheal deviation present. No thyromegaly present.   Cardiovascular: Normal rate and normal heart sounds.    Pulmonary/Chest: Effort normal and breath sounds normal. No respiratory distress. He has no wheezes.   Abdominal: Soft. He exhibits no distension and no mass. There is no hepatosplenomegaly. There is no tenderness. There is no rebound, no guarding and no CVA tenderness. No hernia. Hernia confirmed negative in the right inguinal area and confirmed negative in the left inguinal area.   Genitourinary: Rectum normal, testes normal and penis normal. Rectal exam shows no external hemorrhoid, no mass and no tenderness. Prostate is enlarged. Prostate is not tender. Right testis shows no mass and no tenderness. Left testis shows no mass and no tenderness.       Musculoskeletal: He exhibits no edema or tenderness.   Lymphadenopathy: No inguinal adenopathy noted on the right or left side.   Neurological: He is alert and oriented to person, place, and time.   Skin: Skin is warm and dry. No rash noted. No erythema.     Psychiatric: He has a normal mood and affect. His behavior is normal. Judgment and thought content normal.       Urine dipstick shows negative for all components.  Micro exam: negative for WBC's or RBC's.  Testosterone, Total 304 - 1227 ng/dL 120Low     Resulting " Agency  [facility]         Specimen Collected: 05/30/19 08:15   Last Resulted: 05/30/19 17:00        PSA, SCREEN 0.00 - 4.00 ng/mL 1.1    Comment: PSA Expected levels:   Hormonal Therapy: <0.05 ng/ml   Prostatectomy: <0.01 ng/ml   Radiation Therapy: <1.00 ng/ml    Resulting Agency  [facility]         Specimen Collected: 03/11/19 09:57   Last Resulted: 03/12/19 08:53          Assessment:       1. Hypogonadism male    2. BPH with obstruction/lower urinary tract symptoms    3. Nocturia more than twice per night          Plan:       1. Hypogonadism male  Injection today or soon  Follow up in a month for a symptom check  He will be a good candidate for self-injections or maybe Testopel  Labs in the future  - testosterone cypionate injection 400 mg  - Medication Pre-Authorization    2. BPH with obstruction/lower urinary tract symptoms    - POCT urinalysis, dipstick or tablet reag    3. Nocturia more than twice per night              Follow up in about 1 month (around 7/30/2019) for Follow up, Testosterone Injection.

## 2019-07-03 DIAGNOSIS — G89.29 CHRONIC MUSCULOSKELETAL PAIN: ICD-10-CM

## 2019-07-03 DIAGNOSIS — F32.A DEPRESSION, UNSPECIFIED DEPRESSION TYPE: ICD-10-CM

## 2019-07-03 DIAGNOSIS — M79.10 MYALGIA: ICD-10-CM

## 2019-07-03 DIAGNOSIS — M79.18 CHRONIC MUSCULOSKELETAL PAIN: ICD-10-CM

## 2019-07-03 RX ORDER — DULOXETIN HYDROCHLORIDE 20 MG/1
CAPSULE, DELAYED RELEASE ORAL
Qty: 30 CAPSULE | Refills: 2 | Status: SHIPPED | OUTPATIENT
Start: 2019-07-03 | End: 2019-07-05 | Stop reason: SDUPTHER

## 2019-07-03 RX ORDER — TIZANIDINE 4 MG/1
TABLET ORAL
Qty: 30 TABLET | Refills: 2 | Status: SHIPPED | OUTPATIENT
Start: 2019-07-03 | End: 2019-07-05 | Stop reason: SDUPTHER

## 2019-07-05 DIAGNOSIS — M79.18 CHRONIC MUSCULOSKELETAL PAIN: ICD-10-CM

## 2019-07-05 DIAGNOSIS — F32.A DEPRESSION, UNSPECIFIED DEPRESSION TYPE: ICD-10-CM

## 2019-07-05 DIAGNOSIS — G89.29 CHRONIC MUSCULOSKELETAL PAIN: ICD-10-CM

## 2019-07-05 DIAGNOSIS — M79.10 MYALGIA: ICD-10-CM

## 2019-07-05 RX ORDER — TIZANIDINE 4 MG/1
4 TABLET ORAL NIGHTLY PRN
Qty: 30 TABLET | Refills: 2 | Status: SHIPPED | OUTPATIENT
Start: 2019-07-05 | End: 2019-12-12 | Stop reason: SDUPTHER

## 2019-07-05 RX ORDER — DULOXETIN HYDROCHLORIDE 30 MG/1
30 CAPSULE, DELAYED RELEASE ORAL DAILY
Qty: 90 CAPSULE | Refills: 0 | Status: SHIPPED | OUTPATIENT
Start: 2019-07-05 | End: 2019-12-28

## 2019-07-09 ENCOUNTER — TELEPHONE (OUTPATIENT)
Dept: UROLOGY | Facility: CLINIC | Age: 54
End: 2019-07-09

## 2019-07-09 NOTE — TELEPHONE ENCOUNTER
Patient notified of insurance approval and to be here for his nurse visit tomorrow with Zhanna (MARINA).. GAMAN.

## 2019-07-09 NOTE — TELEPHONE ENCOUNTER
----- Message from Glenda Hoffmann sent at 7/9/2019  3:13 PM CDT -----  Contact: Self   Pt returning call. [phone]

## 2019-07-09 NOTE — TELEPHONE ENCOUNTER
----- Message from Nilsa Guardado sent at 7/9/2019  2:31 PM CDT -----  Contact: Self            Name of Who is Calling: FINA SUNSHINE [[id number]]      What is the request in detail: Pt states he needs to know if insurance approval has been received before he arrives at his depo injection tomorrow. Please contact to further discuss and advise.        Can the clinic reply by [facility]: N      What Number to [facility] Back [facility]: [phone]

## 2019-07-10 ENCOUNTER — CLINICAL SUPPORT (OUTPATIENT)
Dept: UROLOGY | Facility: CLINIC | Age: 54
End: 2019-07-10
Payer: COMMERCIAL

## 2019-07-10 VITALS — BODY MASS INDEX: 30.59 KG/M2 | HEIGHT: 69 IN | WEIGHT: 206.56 LBS | RESPIRATION RATE: 16 BRPM

## 2019-07-10 DIAGNOSIS — E29.1 MALE HYPOGONADISM: Primary | ICD-10-CM

## 2019-07-10 PROCEDURE — 99999 PR PBB SHADOW E&M-EST. PATIENT-LVL III: CPT | Mod: PBBFAC,,,

## 2019-07-10 PROCEDURE — 96372 THER/PROPH/DIAG INJ SC/IM: CPT | Mod: S$GLB,,, | Performed by: UROLOGY

## 2019-07-10 PROCEDURE — 99999 PR PBB SHADOW E&M-EST. PATIENT-LVL III: ICD-10-PCS | Mod: PBBFAC,,,

## 2019-07-10 PROCEDURE — 96372 PR INJECTION,THERAP/PROPH/DIAG2ST, IM OR SUBCUT: ICD-10-PCS | Mod: S$GLB,,, | Performed by: UROLOGY

## 2019-07-10 RX ADMIN — TESTOSTERONE CYPIONATE 400 MG: 200 INJECTION, SOLUTION INTRAMUSCULAR at 09:07

## 2019-07-10 NOTE — PROGRESS NOTES
Name, , allergies, and understanding verified. Patient is here for depo-testosterone injection. Depo-Testosterone 400mg given IM in the right gluteus with no adverse effects. (see MAR)  Patient tolerated procedure well.  Patient was asked to wait an appropriate 15 minutes in ensure that no reaction occurred. Patient was discharged with no acute distress and was instructed to call the office if any questions or concerns arose.

## 2019-07-15 DIAGNOSIS — I10 ESSENTIAL HYPERTENSION: ICD-10-CM

## 2019-07-15 RX ORDER — ATENOLOL 50 MG/1
TABLET ORAL
Qty: 90 TABLET | Refills: 3 | Status: SHIPPED | OUTPATIENT
Start: 2019-07-15 | End: 2019-12-12 | Stop reason: SDUPTHER

## 2019-08-09 DIAGNOSIS — R79.89 LOW TESTOSTERONE IN MALE: ICD-10-CM

## 2019-08-09 RX ORDER — TESTOSTERONE 20.25 MG/1.25G
GEL TOPICAL
Qty: 45 G | Refills: 0 | Status: SHIPPED | OUTPATIENT
Start: 2019-08-09 | End: 2019-12-12 | Stop reason: SDUPTHER

## 2019-08-10 ENCOUNTER — PATIENT OUTREACH (OUTPATIENT)
Dept: ADMINISTRATIVE | Facility: OTHER | Age: 54
End: 2019-08-10

## 2019-08-13 ENCOUNTER — OFFICE VISIT (OUTPATIENT)
Dept: UROLOGY | Facility: CLINIC | Age: 54
End: 2019-08-13
Payer: COMMERCIAL

## 2019-08-13 VITALS — HEIGHT: 69 IN | BODY MASS INDEX: 29.71 KG/M2 | WEIGHT: 200.63 LBS

## 2019-08-13 DIAGNOSIS — N40.1 BPH WITH OBSTRUCTION/LOWER URINARY TRACT SYMPTOMS: ICD-10-CM

## 2019-08-13 DIAGNOSIS — N13.8 BPH WITH OBSTRUCTION/LOWER URINARY TRACT SYMPTOMS: ICD-10-CM

## 2019-08-13 DIAGNOSIS — R35.1 NOCTURIA MORE THAN TWICE PER NIGHT: ICD-10-CM

## 2019-08-13 DIAGNOSIS — E29.1 MALE HYPOGONADISM: Primary | ICD-10-CM

## 2019-08-13 PROCEDURE — 3008F BODY MASS INDEX DOCD: CPT | Mod: CPTII,S$GLB,, | Performed by: UROLOGY

## 2019-08-13 PROCEDURE — 3008F PR BODY MASS INDEX (BMI) DOCUMENTED: ICD-10-PCS | Mod: CPTII,S$GLB,, | Performed by: UROLOGY

## 2019-08-13 PROCEDURE — 99214 OFFICE O/P EST MOD 30 MIN: CPT | Mod: 25,S$GLB,, | Performed by: UROLOGY

## 2019-08-13 PROCEDURE — 99999 PR PBB SHADOW E&M-EST. PATIENT-LVL III: ICD-10-PCS | Mod: PBBFAC,,, | Performed by: UROLOGY

## 2019-08-13 PROCEDURE — 99999 PR PBB SHADOW E&M-EST. PATIENT-LVL III: CPT | Mod: PBBFAC,,, | Performed by: UROLOGY

## 2019-08-13 PROCEDURE — 96372 PR INJECTION,THERAP/PROPH/DIAG2ST, IM OR SUBCUT: ICD-10-PCS | Mod: S$GLB,,, | Performed by: UROLOGY

## 2019-08-13 PROCEDURE — 99214 PR OFFICE/OUTPT VISIT, EST, LEVL IV, 30-39 MIN: ICD-10-PCS | Mod: 25,S$GLB,, | Performed by: UROLOGY

## 2019-08-13 PROCEDURE — 96372 THER/PROPH/DIAG INJ SC/IM: CPT | Mod: S$GLB,,, | Performed by: UROLOGY

## 2019-08-13 RX ORDER — SYRINGE W-NEEDLE,DISPOSAB,3 ML 25GX5/8"
SYRINGE, EMPTY DISPOSABLE MISCELLANEOUS
Qty: 100 SYRINGE | Refills: 0 | Status: SHIPPED | OUTPATIENT
Start: 2019-08-13

## 2019-08-13 RX ORDER — TESTOSTERONE CYPIONATE 200 MG/ML
200 INJECTION, SOLUTION INTRAMUSCULAR
Qty: 10 ML | Refills: 0 | Status: SHIPPED | OUTPATIENT
Start: 2019-08-13 | End: 2019-12-12 | Stop reason: SDUPTHER

## 2019-08-13 RX ADMIN — TESTOSTERONE CYPIONATE 400 MG: 200 INJECTION, SOLUTION INTRAMUSCULAR at 12:08

## 2019-08-13 NOTE — PROGRESS NOTES
Name, , allergies, and understanding verified. Patient is here for depo-testosterone injection. Depo-Testosterone 400mg given IM in the left gluteus  with no adverse effects. (see MAR)  Patient tolerated procedure well.  Patient was asked to wait an appropriate 15 minutes in ensure that no reaction occurred. Patient was discharged with no acute distress and was instructed to call the office if any questions or concerns arose.

## 2019-08-13 NOTE — PROGRESS NOTES
Subjective:       Patient ID: Familia Mccarthy is a 53 y.o. male who was last seen in this office 7/10/2019    Chief Complaint:   Chief Complaint   Patient presents with    Other     one month f/u from depo injection      Hypogonadism    He is here today to discuss hypogonadism.  His symptoms include: Poor energy, Fatigue, Increased Body Fat and Gynecomastia.   Onset of symptoms was several years ago and was gradual in onset. He is bothered by these symptoms.  Risk factors include: Hypertension.  Previous treatments include Androgel, but this has not been helpful.    He had an injection on 7/10/2019.  He feels that it helped.  He is interested in self-injections.    Benign Prostatic Hyperplasia  He patient reports nocturia two times a night. He denies straining, urgency and weak stream. The patient states symptoms are of mild severity. Onset of symptoms was several years ago and was gradual in onset.  He has no personal history and no family history of prostate cancer. He reports a history of no complicating symptoms. He denies flank pain, gross hematuria, kidney stones and recurrent UTI.  He is currently taking no prostate medications.      ACTIVE MEDICAL ISSUES:  Patient Active Problem List   Diagnosis    Hypertension    Hyperlipidemia    Hypertriglyceridemia    Prediabetes    Chronic pain of left knee and right knee, with past surgeries    Low testosterone in male       ALLERGIES AND MEDICATIONS: updated and reviewed.  Review of patient's allergies indicates:  No Known Allergies  Current Outpatient Medications   Medication Sig    ascorbic acid, vitamin C, (VITAMIN C) 1000 MG tablet Take 1,000 mg by mouth once daily.    atenolol (TENORMIN) 50 MG tablet TAKE ONE TABLET BY MOUTH DAILY    calcium carbonate (OS-MELISSA) 600 mg calcium (1,500 mg) Tab Take 2,000 mg by mouth once.    cyanocobalamin (VITAMIN B-12) 1000 MCG tablet Take 5,000 mcg by mouth once daily.    DULoxetine (CYMBALTA) 30 MG capsule Take 1  "capsule (30 mg total) by mouth once daily.    fish oil-omega-3 fatty acids 300-1,000 mg capsule Take by mouth once daily.    meloxicam (MOBIC) 7.5 MG tablet Take 1 tablet (7.5 mg total) by mouth once daily.    multivit-min/FA/lycopen/lutein (CENTRUM SILVER MEN ORAL) Take by mouth.    potassium chloride SA (K-DUR,KLOR-CON) 20 MEQ tablet Take 99 mEq by mouth 2 (two) times daily.    simvastatin (ZOCOR) 80 MG tablet Take 1 tablet (80 mg total) by mouth every evening.    testosterone (ANDROGEL) 20.25 mg/1.25 gram (1.62 %) GlPm apply 3 clicks to skin once a day    tiZANidine (ZANAFLEX) 4 MG tablet Take 1 tablet (4 mg total) by mouth nightly as needed.    diclofenac sodium (VOLTAREN) 1 % Gel Apply 2 g topically 4 (four) times daily.    testosterone cypionate (DEPOTESTOTERONE CYPIONATE) 200 mg/mL injection Inject 1 mL (200 mg total) into the muscle every 14 (fourteen) days.     Current Facility-Administered Medications   Medication    testosterone cypionate injection 400 mg       Review of Systems   Constitutional: Negative for activity change, fatigue, fever and unexpected weight change.   HENT: Negative for congestion.    Eyes: Negative for redness.   Respiratory: Negative for chest tightness and shortness of breath.    Cardiovascular: Negative for chest pain and leg swelling.   Gastrointestinal: Negative for abdominal pain, constipation, diarrhea, nausea and vomiting.   Genitourinary: Negative for dysuria, flank pain, frequency, hematuria, penile pain, penile swelling, scrotal swelling, testicular pain and urgency.   Musculoskeletal: Negative for arthralgias and back pain.   Neurological: Negative for dizziness and light-headedness.   Psychiatric/Behavioral: Negative for behavioral problems and confusion. The patient is not nervous/anxious.    All other systems reviewed and are negative.      Objective:      Vitals:    08/13/19 1138   Weight: 91 kg (200 lb 9.9 oz)   Height: 5' 9" (1.753 m)     Physical Exam "   Nursing note and vitals reviewed.  Constitutional: He is oriented to person, place, and time. He appears well-developed and well-nourished.   HENT:   Head: Normocephalic.   Eyes: Conjunctivae are normal.   Neck: Normal range of motion. Neck supple. No tracheal deviation present. No thyromegaly present.   Cardiovascular: Normal rate and normal heart sounds.    Pulmonary/Chest: Effort normal and breath sounds normal. No respiratory distress. He has no wheezes.   Abdominal: Soft. Bowel sounds are normal. There is no hepatosplenomegaly. There is no tenderness. There is no rebound and no CVA tenderness. No hernia.   Musculoskeletal: Normal range of motion. He exhibits no edema or tenderness.   Lymphadenopathy:     He has no cervical adenopathy.   Neurological: He is alert and oriented to person, place, and time.   Skin: Skin is warm and dry. No rash noted. No erythema.     Psychiatric: He has a normal mood and affect. His behavior is normal. Judgment and thought content normal.       Urine dipstick shows negative for all components.  Micro exam: negative for WBC's or RBC's.    Assessment:       1. Male hypogonadism    2. BPH with obstruction/lower urinary tract symptoms    3. Nocturia more than twice per night          Plan:       1. Male hypogonadism  Injection today  Start self-injections every 2 weeks starting in 1 month    - testosterone cypionate (DEPOTESTOTERONE CYPIONATE) 200 mg/mL injection; Inject 1 mL (200 mg total) into the muscle every 14 (fourteen) days.  Dispense: 10 mL; Refill: 0  - Testosterone; Future  - PSA; Future  - CBC with Auto Differential; Future  - Hepatic Function Panel; Future  - Lipid Panel; Future    2. BPH with obstruction/lower urinary tract symptoms  stable    3. Nocturia more than twice per night  Limit evening fluids            No follow-ups on file.

## 2019-12-12 ENCOUNTER — OFFICE VISIT (OUTPATIENT)
Dept: FAMILY MEDICINE | Facility: CLINIC | Age: 54
End: 2019-12-12
Payer: COMMERCIAL

## 2019-12-12 VITALS
DIASTOLIC BLOOD PRESSURE: 88 MMHG | BODY MASS INDEX: 30.17 KG/M2 | HEART RATE: 90 BPM | WEIGHT: 203.69 LBS | SYSTOLIC BLOOD PRESSURE: 136 MMHG | HEIGHT: 69 IN | OXYGEN SATURATION: 99 %

## 2019-12-12 DIAGNOSIS — E78.5 HYPERLIPIDEMIA, UNSPECIFIED HYPERLIPIDEMIA TYPE: ICD-10-CM

## 2019-12-12 DIAGNOSIS — I10 ESSENTIAL HYPERTENSION: Primary | ICD-10-CM

## 2019-12-12 DIAGNOSIS — R79.89 LOW TESTOSTERONE IN MALE: ICD-10-CM

## 2019-12-12 DIAGNOSIS — E29.1 MALE HYPOGONADISM: ICD-10-CM

## 2019-12-12 PROCEDURE — 3075F SYST BP GE 130 - 139MM HG: CPT | Mod: CPTII,S$GLB,, | Performed by: FAMILY MEDICINE

## 2019-12-12 PROCEDURE — 99214 OFFICE O/P EST MOD 30 MIN: CPT | Mod: S$GLB,,, | Performed by: FAMILY MEDICINE

## 2019-12-12 PROCEDURE — 99999 PR PBB SHADOW E&M-EST. PATIENT-LVL III: CPT | Mod: PBBFAC,,, | Performed by: FAMILY MEDICINE

## 2019-12-12 PROCEDURE — 3079F PR MOST RECENT DIASTOLIC BLOOD PRESSURE 80-89 MM HG: ICD-10-PCS | Mod: CPTII,S$GLB,, | Performed by: FAMILY MEDICINE

## 2019-12-12 PROCEDURE — 3008F PR BODY MASS INDEX (BMI) DOCUMENTED: ICD-10-PCS | Mod: CPTII,S$GLB,, | Performed by: FAMILY MEDICINE

## 2019-12-12 PROCEDURE — 3079F DIAST BP 80-89 MM HG: CPT | Mod: CPTII,S$GLB,, | Performed by: FAMILY MEDICINE

## 2019-12-12 PROCEDURE — 99214 PR OFFICE/OUTPT VISIT, EST, LEVL IV, 30-39 MIN: ICD-10-PCS | Mod: S$GLB,,, | Performed by: FAMILY MEDICINE

## 2019-12-12 PROCEDURE — 99999 PR PBB SHADOW E&M-EST. PATIENT-LVL III: ICD-10-PCS | Mod: PBBFAC,,, | Performed by: FAMILY MEDICINE

## 2019-12-12 PROCEDURE — 3008F BODY MASS INDEX DOCD: CPT | Mod: CPTII,S$GLB,, | Performed by: FAMILY MEDICINE

## 2019-12-12 PROCEDURE — 3075F PR MOST RECENT SYSTOLIC BLOOD PRESS GE 130-139MM HG: ICD-10-PCS | Mod: CPTII,S$GLB,, | Performed by: FAMILY MEDICINE

## 2019-12-12 RX ORDER — ATENOLOL 50 MG/1
50 TABLET ORAL DAILY
Qty: 90 TABLET | Refills: 3 | Status: SHIPPED | OUTPATIENT
Start: 2019-12-12 | End: 2021-01-13

## 2019-12-12 RX ORDER — SIMVASTATIN 80 MG/1
80 TABLET, FILM COATED ORAL NIGHTLY
Qty: 90 TABLET | Refills: 3 | Status: SHIPPED | OUTPATIENT
Start: 2019-12-12 | End: 2020-12-15

## 2019-12-12 RX ORDER — TESTOSTERONE CYPIONATE 200 MG/ML
200 INJECTION, SOLUTION INTRAMUSCULAR
Qty: 10 ML | Refills: 2 | Status: SHIPPED | OUTPATIENT
Start: 2019-12-12 | End: 2020-06-23

## 2019-12-12 NOTE — PROGRESS NOTES
HISTORY OF PRESENT ILLNESS:  Familia Mccarthy is a 54 y.o. male who presents to the clinic today for Follow-up (per letter) and Medication Refill  .   He is doing well and the testosterone.  He is giving himself the shots with his daughter.  But he is not able to give him to himself so he will need to be put on the nurse's schedule for whenever he is going to get him.  He has no side effects except for the pain around the injection site and he is well-versed in the care.  The patient is taking hypertensive medications compliantly without side effects.  Denies chest pain, dyspnea, edema, or TIA's.  He is losing weight.  Per problem list      PAST MEDICAL HISTORY:  Past Medical History:   Diagnosis Date    Hyperlipidemia     Hypertension        PAST SURGICAL HISTORY:  Past Surgical History:   Procedure Laterality Date    KNEE SURGERY      bilateral       SOCIAL HISTORY:  Social History     Socioeconomic History    Marital status:      Spouse name: Not on file    Number of children: Not on file    Years of education: Not on file    Highest education level: Not on file   Occupational History    Not on file   Social Needs    Financial resource strain: Not on file    Food insecurity:     Worry: Not on file     Inability: Not on file    Transportation needs:     Medical: Not on file     Non-medical: Not on file   Tobacco Use    Smoking status: Never Smoker    Smokeless tobacco: Never Used   Substance and Sexual Activity    Alcohol use: Yes     Comment: social    Drug use: No    Sexual activity: Yes     Partners: Female   Lifestyle    Physical activity:     Days per week: Not on file     Minutes per session: Not on file    Stress: Not on file   Relationships    Social connections:     Talks on phone: Not on file     Gets together: Not on file     Attends Gnosticism service: Not on file     Active member of club or organization: Not on file     Attends meetings of clubs or organizations: Not on  "file     Relationship status: Not on file   Other Topics Concern    Not on file   Social History Narrative    Not on file       FAMILY HISTORY:  Family History   Problem Relation Age of Onset    Cancer Mother     Heart disease Mother     Diabetes Brother        ALLERGIES AND MEDICATIONS: updated and reviewed.  Review of patient's allergies indicates:  No Known Allergies  Medication List with Changes/Refills   Current Medications    ASCORBIC ACID, VITAMIN C, (VITAMIN C) 1000 MG TABLET    Take 1,000 mg by mouth once daily.    CALCIUM CARBONATE (OS-MELISSA) 600 MG CALCIUM (1,500 MG) TAB    Take 2,000 mg by mouth once.    CYANOCOBALAMIN (VITAMIN B-12) 1000 MCG TABLET    Take 5,000 mcg by mouth once daily.    DULOXETINE (CYMBALTA) 30 MG CAPSULE    Take 1 capsule (30 mg total) by mouth once daily.    FISH OIL-OMEGA-3 FATTY ACIDS 300-1,000 MG CAPSULE    Take by mouth once daily.    MULTIVIT-MIN/FA/LYCOPEN/LUTEIN (CENTRUM SILVER MEN ORAL)    Take by mouth.    POTASSIUM CHLORIDE SA (K-DUR,KLOR-CON) 20 MEQ TABLET    Take 99 mEq by mouth 2 (two) times daily.    SYRINGE WITH NEEDLE (BD INTEGRA SYRINGE) 3 ML 21 GAUGE X 1 1/2" SYRG    Inject as directed   Changed and/or Refilled Medications    Modified Medication Previous Medication    ATENOLOL (TENORMIN) 50 MG TABLET atenolol (TENORMIN) 50 MG tablet       Take 1 tablet (50 mg total) by mouth once daily.    TAKE ONE TABLET BY MOUTH DAILY    SIMVASTATIN (ZOCOR) 80 MG TABLET simvastatin (ZOCOR) 80 MG tablet       Take 1 tablet (80 mg total) by mouth every evening.    Take 1 tablet (80 mg total) by mouth every evening.    TESTOSTERONE CYPIONATE (DEPOTESTOTERONE CYPIONATE) 200 MG/ML INJECTION testosterone cypionate (DEPOTESTOTERONE CYPIONATE) 200 mg/mL injection       Inject 1 mL (200 mg total) into the muscle every 14 (fourteen) days.    Inject 1 mL (200 mg total) into the muscle every 14 (fourteen) days.   Discontinued Medications    DICLOFENAC SODIUM (VOLTAREN) 1 % GEL    Apply 2 " "g topically 4 (four) times daily.    MELOXICAM (MOBIC) 7.5 MG TABLET    Take 1 tablet (7.5 mg total) by mouth once daily.    TESTOSTERONE (ANDROGEL) 20.25 MG/1.25 GRAM (1.62 %) GLPM    apply 3 clicks to skin once a day    TIZANIDINE (ZANAFLEX) 4 MG TABLET    Take 1 tablet (4 mg total) by mouth nightly as needed.          CARE TEAM:  Patient Care Team:  Donaldo Duke MD as PCP - General (Family Medicine)  PETRA Le MA as Care Coordinator         REVIEW OF SYSTEMS:  Review of Systems   Constitutional: Negative.    HENT: Negative.    Eyes: Negative.    Respiratory: Negative.    Cardiovascular: Negative.    Gastrointestinal: Negative.    Genitourinary: Negative.    Musculoskeletal: Negative.    Neurological: Negative.    Psychiatric/Behavioral: Negative.          PHYSICAL EXAM:  Vitals:    12/12/19 0735   BP: 136/88   Pulse: 90     Weight: 92.4 kg (203 lb 11.3 oz)   Height: 5' 9" (175.3 cm)   Body mass index is 30.08 kg/m².    Wt Readings from Last 15 Encounters:   12/12/19 92.4 kg (203 lb 11.3 oz)   08/13/19 91 kg (200 lb 9.9 oz)   07/10/19 93.7 kg (206 lb 9.1 oz)   07/02/19 93.7 kg (206 lb 9.1 oz)   06/03/19 96.4 kg (212 lb 8.4 oz)   02/01/19 97.8 kg (215 lb 9.8 oz)   08/10/17 95 kg (209 lb 7 oz)   07/13/16 94 kg (207 lb 3.7 oz)   06/18/15 93 kg (205 lb)   05/13/15 93 kg (205 lb)   11/19/14 93.4 kg (206 lb)   07/22/14 102.5 kg (226 lb)   07/07/14 102.5 kg (226 lb)   03/28/14 105.7 kg (233 lb)       Physical Examination: General appearance - alert, well appearing, and in no distress  Mental status - alert, oriented to person, place, and time  Eyes - pupils equal and reactive, extraocular eye movements intact  Nose - normal and patent, no erythema, discharge or polyps  Mouth - mucous membranes moist, pharynx normal without lesions  Neck - supple, no significant adenopathy  Lymphatics - no palpable lymphadenopathy, no hepatosplenomegaly  Chest - clear to auscultation, no wheezes, rales or " "rhonchi, symmetric air entry  Heart - normal rate, regular rhythm, normal S1, S2, no murmurs, rubs, clicks or gallops  Abdomen - soft, nontender, nondistended, no masses or organomegaly  Back exam - full range of motion, no tenderness, palpable spasm or pain on motion  Neurological - alert, oriented, normal speech, no focal findings or movement disorder noted  Musculoskeletal - no joint tenderness, deformity or swelling  Skin - normal coloration and turgor, no rashes, no suspicious skin lesions noted    Get new labs      Medication List with Changes/Refills   Current Medications    ASCORBIC ACID, VITAMIN C, (VITAMIN C) 1000 MG TABLET    Take 1,000 mg by mouth once daily.    CALCIUM CARBONATE (OS-MELISSA) 600 MG CALCIUM (1,500 MG) TAB    Take 2,000 mg by mouth once.    CYANOCOBALAMIN (VITAMIN B-12) 1000 MCG TABLET    Take 5,000 mcg by mouth once daily.    DULOXETINE (CYMBALTA) 30 MG CAPSULE    Take 1 capsule (30 mg total) by mouth once daily.    FISH OIL-OMEGA-3 FATTY ACIDS 300-1,000 MG CAPSULE    Take by mouth once daily.    MULTIVIT-MIN/FA/LYCOPEN/LUTEIN (CENTRUM SILVER MEN ORAL)    Take by mouth.    POTASSIUM CHLORIDE SA (K-DUR,KLOR-CON) 20 MEQ TABLET    Take 99 mEq by mouth 2 (two) times daily.    SYRINGE WITH NEEDLE (BD INTEGRA SYRINGE) 3 ML 21 GAUGE X 1 1/2" SYRG    Inject as directed   Changed and/or Refilled Medications    Modified Medication Previous Medication    ATENOLOL (TENORMIN) 50 MG TABLET atenolol (TENORMIN) 50 MG tablet       Take 1 tablet (50 mg total) by mouth once daily.    TAKE ONE TABLET BY MOUTH DAILY    SIMVASTATIN (ZOCOR) 80 MG TABLET simvastatin (ZOCOR) 80 MG tablet       Take 1 tablet (80 mg total) by mouth every evening.    Take 1 tablet (80 mg total) by mouth every evening.    TESTOSTERONE CYPIONATE (DEPOTESTOTERONE CYPIONATE) 200 MG/ML INJECTION testosterone cypionate (DEPOTESTOTERONE CYPIONATE) 200 mg/mL injection       Inject 1 mL (200 mg total) into the muscle every 14 (fourteen) days.   "  Inject 1 mL (200 mg total) into the muscle every 14 (fourteen) days.   Discontinued Medications    DICLOFENAC SODIUM (VOLTAREN) 1 % GEL    Apply 2 g topically 4 (four) times daily.    MELOXICAM (MOBIC) 7.5 MG TABLET    Take 1 tablet (7.5 mg total) by mouth once daily.    TESTOSTERONE (ANDROGEL) 20.25 MG/1.25 GRAM (1.62 %) GLPM    apply 3 clicks to skin once a day    TIZANIDINE (ZANAFLEX) 4 MG TABLET    Take 1 tablet (4 mg total) by mouth nightly as needed.     ASSESSMENT AND PLAN:  1. Essential hypertension  The current medical regimen is effective;  continue present plan and medications.    - atenolol (TENORMIN) 50 MG tablet; Take 1 tablet (50 mg total) by mouth once daily.  Dispense: 90 tablet; Refill: 3  - PSA, Screening; Future  - Testosterone Panel; Future  - Lipid panel; Future  - Comprehensive metabolic panel; Future  - CBC Without Differential; Future    2. Low testosterone in male  The current medical regimen is effective;  continue present plan and medications.  Recheck his lab work and get on the nurse's schedule for his injections with his daughter is not intact  - PSA, Screening; Future  - Testosterone Panel; Future  - Lipid panel; Future  - Comprehensive metabolic panel; Future  - CBC Without Differential; Future    3. Hyperlipidemia, unspecified hyperlipidemia type  The current medical regimen is effective;  continue present plan and medications.    - simvastatin (ZOCOR) 80 MG tablet; Take 1 tablet (80 mg total) by mouth every evening.  Dispense: 90 tablet; Refill: 3  - PSA, Screening; Future  - Testosterone Panel; Future  - Lipid panel; Future  - Comprehensive metabolic panel; Future  - CBC Without Differential; Future    4. Male hypogonadism  The current medical regimen is effective;  continue present plan and medications.    - testosterone cypionate (DEPOTESTOTERONE CYPIONATE) 200 mg/mL injection; Inject 1 mL (200 mg total) into the muscle every 14 (fourteen) days.  Dispense: 10 mL; Refill: 2  -  PSA, Screening; Future  - Testosterone Panel; Future  - Lipid panel; Future  - Comprehensive metabolic panel; Future  - CBC Without Differential; Future       He has an insurance change it is not sure if he can go and see the urologist for his follow-up because he has new insurance.  Will go ahead and refill his testosterone and get his new lab work before the end of the year.  He seems to be doing quite well with it and we went over on nutritional plan and fluid plan to help him further increase his health.  Future Appointments   Date Time Provider Department Center   12/19/2019  8:00 AM NURSE, [facility] MED/INT MED [facility]aima Tejeda   2/11/2020 10:30 AM JAKE Russ MD [facility]   6/9/2020  8:00 AM Donaldo Duke MD [facility] MED [facility] Te[facility]       Follow up in about 6 months (around 6/12/2020) for assess treatment plan, wellness exam. or sooner as needed.

## 2019-12-19 ENCOUNTER — LAB VISIT (OUTPATIENT)
Dept: LAB | Facility: HOSPITAL | Age: 54
End: 2019-12-19
Attending: FAMILY MEDICINE
Payer: COMMERCIAL

## 2019-12-19 ENCOUNTER — CLINICAL SUPPORT (OUTPATIENT)
Dept: FAMILY MEDICINE | Facility: CLINIC | Age: 54
End: 2019-12-19
Payer: COMMERCIAL

## 2019-12-19 DIAGNOSIS — E78.5 HYPERLIPIDEMIA, UNSPECIFIED HYPERLIPIDEMIA TYPE: ICD-10-CM

## 2019-12-19 DIAGNOSIS — R79.89 LOW TESTOSTERONE IN MALE: Primary | ICD-10-CM

## 2019-12-19 DIAGNOSIS — R79.89 LOW TESTOSTERONE IN MALE: ICD-10-CM

## 2019-12-19 DIAGNOSIS — I10 ESSENTIAL HYPERTENSION: ICD-10-CM

## 2019-12-19 DIAGNOSIS — E29.1 MALE HYPOGONADISM: ICD-10-CM

## 2019-12-19 LAB
ALBUMIN SERPL BCP-MCNC: 4.7 G/DL (ref 3.5–5.2)
ALP SERPL-CCNC: 77 U/L (ref 55–135)
ALT SERPL W/O P-5'-P-CCNC: 30 U/L (ref 10–44)
ANION GAP SERPL CALC-SCNC: 5 MMOL/L (ref 8–16)
AST SERPL-CCNC: 24 U/L (ref 10–40)
BILIRUB SERPL-MCNC: 0.6 MG/DL (ref 0.1–1)
BUN SERPL-MCNC: 14 MG/DL (ref 6–20)
CALCIUM SERPL-MCNC: 9.9 MG/DL (ref 8.7–10.5)
CHLORIDE SERPL-SCNC: 105 MMOL/L (ref 95–110)
CHOLEST SERPL-MCNC: 153 MG/DL (ref 120–199)
CHOLEST/HDLC SERPL: 3.3 {RATIO} (ref 2–5)
CO2 SERPL-SCNC: 31 MMOL/L (ref 23–29)
COMPLEXED PSA SERPL-MCNC: 1.8 NG/ML (ref 0–4)
CREAT SERPL-MCNC: 1.2 MG/DL (ref 0.5–1.4)
ERYTHROCYTE [DISTWIDTH] IN BLOOD BY AUTOMATED COUNT: 12.7 % (ref 11.5–14.5)
EST. GFR  (AFRICAN AMERICAN): >60 ML/MIN/1.73 M^2
EST. GFR  (NON AFRICAN AMERICAN): >60 ML/MIN/1.73 M^2
GLUCOSE SERPL-MCNC: 141 MG/DL (ref 70–110)
HCT VFR BLD AUTO: 51.6 % (ref 40–54)
HDLC SERPL-MCNC: 46 MG/DL (ref 40–75)
HDLC SERPL: 30.1 % (ref 20–50)
HGB BLD-MCNC: 16.8 G/DL (ref 14–18)
LDLC SERPL CALC-MCNC: 84.8 MG/DL (ref 63–159)
MCH RBC QN AUTO: 30.9 PG (ref 27–31)
MCHC RBC AUTO-ENTMCNC: 32.6 G/DL (ref 32–36)
MCV RBC AUTO: 95 FL (ref 82–98)
NONHDLC SERPL-MCNC: 107 MG/DL
PLATELET # BLD AUTO: 230 K/UL (ref 150–350)
PMV BLD AUTO: 10.3 FL (ref 9.2–12.9)
POTASSIUM SERPL-SCNC: 4.6 MMOL/L (ref 3.5–5.1)
PROT SERPL-MCNC: 8 G/DL (ref 6–8.4)
RBC # BLD AUTO: 5.43 M/UL (ref 4.6–6.2)
SODIUM SERPL-SCNC: 141 MMOL/L (ref 136–145)
TRIGL SERPL-MCNC: 111 MG/DL (ref 30–150)
WBC # BLD AUTO: 6.74 K/UL (ref 3.9–12.7)

## 2019-12-19 PROCEDURE — 99999 PR PBB SHADOW E&M-EST. PATIENT-LVL II: ICD-10-PCS | Mod: PBBFAC,,,

## 2019-12-19 PROCEDURE — 84153 ASSAY OF PSA TOTAL: CPT

## 2019-12-19 PROCEDURE — 80061 LIPID PANEL: CPT

## 2019-12-19 PROCEDURE — 99999 PR PBB SHADOW E&M-EST. PATIENT-LVL II: CPT | Mod: PBBFAC,,,

## 2019-12-19 PROCEDURE — 80053 COMPREHEN METABOLIC PANEL: CPT

## 2019-12-19 PROCEDURE — 36415 COLL VENOUS BLD VENIPUNCTURE: CPT | Mod: PO

## 2019-12-19 PROCEDURE — 84270 ASSAY OF SEX HORMONE GLOBUL: CPT

## 2019-12-19 PROCEDURE — 85027 COMPLETE CBC AUTOMATED: CPT

## 2019-12-19 NOTE — PROGRESS NOTES
After obtaining consent, and per orders of Dr. Duke, injection of 1mL Testosterone 200mg/mL given into the left deltoid per pt request by Kenna Barnard. Patient instructed to remain in clinic for 20 minutes afterwards, and to report any adverse reaction to me immediately.

## 2019-12-27 LAB
ALBUMIN SERPL-MCNC: 4.8 G/DL (ref 3.6–5.1)
SHBG SERPL-SCNC: 28 NMOL/L (ref 10–50)
TESTOST FREE SERPL-MCNC: 57.8 PG/ML (ref 46–224)
TESTOST SERPL-MCNC: 396 NG/DL (ref 250–1100)
TESTOSTERONE.FREE+WB SERPL-MCNC: 126.3 NG/DL (ref 110–575)

## 2019-12-28 DIAGNOSIS — M79.18 CHRONIC MUSCULOSKELETAL PAIN: ICD-10-CM

## 2019-12-28 DIAGNOSIS — F32.A DEPRESSION, UNSPECIFIED DEPRESSION TYPE: ICD-10-CM

## 2019-12-28 DIAGNOSIS — M79.10 MYALGIA: ICD-10-CM

## 2019-12-28 DIAGNOSIS — G89.29 CHRONIC MUSCULOSKELETAL PAIN: ICD-10-CM

## 2019-12-28 RX ORDER — DULOXETIN HYDROCHLORIDE 30 MG/1
CAPSULE, DELAYED RELEASE ORAL
Qty: 90 CAPSULE | Refills: 0 | Status: SHIPPED | OUTPATIENT
Start: 2019-12-28 | End: 2020-03-30

## 2019-12-28 RX ORDER — TIZANIDINE 4 MG/1
TABLET ORAL
Qty: 30 TABLET | Refills: 2 | Status: SHIPPED | OUTPATIENT
Start: 2019-12-28 | End: 2020-03-25

## 2020-03-25 DIAGNOSIS — M79.10 MYALGIA: ICD-10-CM

## 2020-03-25 RX ORDER — TIZANIDINE 4 MG/1
TABLET ORAL
Qty: 30 TABLET | Refills: 2 | Status: SHIPPED | OUTPATIENT
Start: 2020-03-25 | End: 2020-06-29

## 2020-03-30 DIAGNOSIS — M79.18 CHRONIC MUSCULOSKELETAL PAIN: ICD-10-CM

## 2020-03-30 DIAGNOSIS — G89.29 CHRONIC MUSCULOSKELETAL PAIN: ICD-10-CM

## 2020-03-30 DIAGNOSIS — F32.A DEPRESSION, UNSPECIFIED DEPRESSION TYPE: ICD-10-CM

## 2020-03-30 RX ORDER — DULOXETIN HYDROCHLORIDE 30 MG/1
CAPSULE, DELAYED RELEASE ORAL
Qty: 90 CAPSULE | Refills: 0 | Status: SHIPPED | OUTPATIENT
Start: 2020-03-30 | End: 2020-07-01

## 2021-01-16 DIAGNOSIS — Z00.00 ANNUAL PHYSICAL EXAM: Primary | ICD-10-CM

## 2021-01-16 DIAGNOSIS — F32.A DEPRESSION, UNSPECIFIED DEPRESSION TYPE: ICD-10-CM

## 2021-01-16 DIAGNOSIS — G89.29 CHRONIC MUSCULOSKELETAL PAIN: ICD-10-CM

## 2021-01-16 DIAGNOSIS — M79.18 CHRONIC MUSCULOSKELETAL PAIN: ICD-10-CM

## 2021-01-16 RX ORDER — DULOXETIN HYDROCHLORIDE 30 MG/1
CAPSULE, DELAYED RELEASE ORAL
Qty: 90 CAPSULE | Refills: 0 | OUTPATIENT
Start: 2021-01-16

## 2021-01-19 ENCOUNTER — TELEPHONE (OUTPATIENT)
Dept: FAMILY MEDICINE | Facility: CLINIC | Age: 56
End: 2021-01-19

## 2021-01-22 ENCOUNTER — OFFICE VISIT (OUTPATIENT)
Dept: FAMILY MEDICINE | Facility: CLINIC | Age: 56
End: 2021-01-22
Payer: COMMERCIAL

## 2021-01-22 VITALS
OXYGEN SATURATION: 99 % | SYSTOLIC BLOOD PRESSURE: 116 MMHG | HEIGHT: 69 IN | TEMPERATURE: 98 F | DIASTOLIC BLOOD PRESSURE: 62 MMHG | BODY MASS INDEX: 30.36 KG/M2 | WEIGHT: 205 LBS | HEART RATE: 77 BPM

## 2021-01-22 DIAGNOSIS — F32.A DEPRESSION, UNSPECIFIED DEPRESSION TYPE: ICD-10-CM

## 2021-01-22 DIAGNOSIS — E78.1 HYPERTRIGLYCERIDEMIA: ICD-10-CM

## 2021-01-22 DIAGNOSIS — Z00.00 ANNUAL PHYSICAL EXAM: Primary | ICD-10-CM

## 2021-01-22 DIAGNOSIS — E78.5 HYPERLIPIDEMIA, UNSPECIFIED HYPERLIPIDEMIA TYPE: ICD-10-CM

## 2021-01-22 DIAGNOSIS — R79.89 LOW TESTOSTERONE IN MALE: ICD-10-CM

## 2021-01-22 DIAGNOSIS — R73.03 PREDIABETES: ICD-10-CM

## 2021-01-22 DIAGNOSIS — G89.29 CHRONIC MUSCULOSKELETAL PAIN: ICD-10-CM

## 2021-01-22 DIAGNOSIS — I10 HYPERTENSION, UNSPECIFIED TYPE: ICD-10-CM

## 2021-01-22 DIAGNOSIS — M25.562 CHRONIC PAIN OF LEFT KNEE: ICD-10-CM

## 2021-01-22 DIAGNOSIS — G89.29 CHRONIC PAIN OF LEFT KNEE: ICD-10-CM

## 2021-01-22 DIAGNOSIS — M79.18 CHRONIC MUSCULOSKELETAL PAIN: ICD-10-CM

## 2021-01-22 PROCEDURE — 99999 PR PBB SHADOW E&M-EST. PATIENT-LVL III: ICD-10-PCS | Mod: PBBFAC,,, | Performed by: FAMILY MEDICINE

## 2021-01-22 PROCEDURE — 96372 PR INJECTION,THERAP/PROPH/DIAG2ST, IM OR SUBCUT: ICD-10-PCS | Mod: S$GLB,,, | Performed by: FAMILY MEDICINE

## 2021-01-22 PROCEDURE — 99396 PREV VISIT EST AGE 40-64: CPT | Mod: 25,S$GLB,, | Performed by: FAMILY MEDICINE

## 2021-01-22 PROCEDURE — 99396 PR PREVENTIVE VISIT,EST,40-64: ICD-10-PCS | Mod: 25,S$GLB,, | Performed by: FAMILY MEDICINE

## 2021-01-22 PROCEDURE — 96372 THER/PROPH/DIAG INJ SC/IM: CPT | Mod: S$GLB,,, | Performed by: FAMILY MEDICINE

## 2021-01-22 PROCEDURE — 99999 PR PBB SHADOW E&M-EST. PATIENT-LVL III: CPT | Mod: PBBFAC,,, | Performed by: FAMILY MEDICINE

## 2021-01-22 RX ORDER — DULOXETIN HYDROCHLORIDE 30 MG/1
30 CAPSULE, DELAYED RELEASE ORAL DAILY
Qty: 90 CAPSULE | Refills: 3 | Status: SHIPPED | OUTPATIENT
Start: 2021-01-22 | End: 2022-02-16

## 2021-01-22 RX ORDER — KETOROLAC TROMETHAMINE 30 MG/ML
30 INJECTION, SOLUTION INTRAMUSCULAR; INTRAVENOUS
Status: COMPLETED | OUTPATIENT
Start: 2021-01-22 | End: 2021-01-22

## 2021-01-22 RX ADMIN — KETOROLAC TROMETHAMINE 30 MG: 30 INJECTION, SOLUTION INTRAMUSCULAR; INTRAVENOUS at 04:01

## 2021-01-28 PROBLEM — F32.A DEPRESSION: Status: ACTIVE | Noted: 2021-01-28

## 2021-01-28 PROBLEM — M79.18 CHRONIC MUSCULOSKELETAL PAIN: Status: ACTIVE | Noted: 2021-01-28

## 2021-01-28 PROBLEM — G89.29 CHRONIC MUSCULOSKELETAL PAIN: Status: ACTIVE | Noted: 2021-01-28

## 2021-03-27 DIAGNOSIS — E29.1 MALE HYPOGONADISM: ICD-10-CM

## 2021-03-27 RX ORDER — TESTOSTERONE CYPIONATE 200 MG/ML
INJECTION, SOLUTION INTRAMUSCULAR
Qty: 10 ML | Refills: 2 | Status: SHIPPED | OUTPATIENT
Start: 2021-03-27 | End: 2022-01-07

## 2022-01-04 DIAGNOSIS — I10 ESSENTIAL HYPERTENSION: ICD-10-CM

## 2022-01-04 RX ORDER — ATENOLOL 50 MG/1
TABLET ORAL
Qty: 90 TABLET | Refills: 0 | Status: SHIPPED | OUTPATIENT
Start: 2022-01-04 | End: 2022-04-11

## 2022-01-04 NOTE — TELEPHONE ENCOUNTER
Care Due:                  Date            Visit Type   Department     Provider  --------------------------------------------------------------------------------                                             [facility] FAMILY                                           MEDICINE/INTERN  Last Visit: 01-      None         AL MED         Donaldo Duke  Next Visit: None Scheduled  None         None Found                                                            Last  Test          Frequency    Reason                     Performed    Due Date  --------------------------------------------------------------------------------    Office Visit  12 months..  DULoxetine, simvastatin..  01- 01-    CMP.........  12 months..  DULoxetine, simvastatin..  Not Found    Overdue    Lipid Panel.  12 months..  simvastatin..............  Not Found    Overdue    Powered by [employer]. Reference number: [id number].   1/04/2022 8:03:00 AM CST

## 2022-01-04 NOTE — TELEPHONE ENCOUNTER
Provider Staff:     Action is required for this patient.   Please see care gap opportunities below in Care Due Message.     Thanks!  [facility]     Appointments      Date Provider   Last Visit   1/22/2021 Donaldo Duke MD   Next Visit   Visit date not found Donaldo Duke MD     Note composed:10:08 AM 01/04/2022

## 2022-01-04 NOTE — TELEPHONE ENCOUNTER
Refill Authorization Note   Familia Mccarthy  is requesting a refill authorization.  Brief Assessment and Rationale for Refill:  Approve     Medication Therapy Plan:       Medication Reconciliation Completed: No   Comments:   --->Care Gap information included below if applicable.       Requested Prescriptions   Pending Prescriptions Disp Refills    atenoloL (TENORMIN) 50 MG tablet [Pharmacy Med Name: atenolol 50 mg tablet] 90 tablet 0     Sig: TAKE ONE TABLET BY MOUTH EVERY DAY       Cardiovascular:  Beta Blockers Passed - 1/4/2022  8:10 AM        Passed - Patient is at least 18 years old        Passed - Last BP in normal range within 360 days     BP Readings from Last 1 Encounters:   01/22/21 116/62               Passed - Last Heart Rate in normal range within 360 days     Pulse Readings from Last 1 Encounters:   01/22/21 77              Passed - Valid encounter within last 15 months     Recent Visits  Date Type Provider Dept   01/22/21 Office Visit Donaldo Duke MD [facility] Family Medicine/Internal Med   Showing recent visits within past 720 days and meeting all other requirements  Future Appointments  No visits were found meeting these conditions.  Showing future appointments within next 150 days and meeting all other requirements                    Appointments  past 12m or future 3m with PCP    Date Provider   Last Visit   1/22/2021 Donaldo Duke MD   Next Visit   Visit date not found Donaldo Duke MD   ED visits in past 90 days: 0     Note composed:9:56 AM 01/04/2022

## 2022-01-07 DIAGNOSIS — E29.1 MALE HYPOGONADISM: ICD-10-CM

## 2022-01-07 RX ORDER — TESTOSTERONE CYPIONATE 200 MG/ML
INJECTION, SOLUTION INTRAMUSCULAR
Qty: 10 ML | Refills: 2 | Status: SHIPPED | OUTPATIENT
Start: 2022-01-07 | End: 2022-10-28 | Stop reason: SDUPTHER

## 2022-01-07 NOTE — TELEPHONE ENCOUNTER
No new care gaps identified.  Powered by [employer]. Reference number: [id number].   1/07/2022 1:32:04 PM CST

## 2022-02-02 DIAGNOSIS — M79.10 MYALGIA: ICD-10-CM

## 2022-02-02 RX ORDER — TIZANIDINE 4 MG/1
TABLET ORAL
Qty: 30 TABLET | Refills: 2 | Status: SHIPPED | OUTPATIENT
Start: 2022-02-02 | End: 2022-04-27

## 2022-02-02 NOTE — TELEPHONE ENCOUNTER
Last Office Visit Info:   The patient's last visit with Donaldo Duke MD was on 1/22/2021.    The patient's last visit in current department was on Visit date not found.        Last CBC Results:   Lab Results   Component Value Date    WBC 6.74 12/19/2019    HGB 16.8 12/19/2019    HCT 51.6 12/19/2019     12/19/2019       Last CMP Results  Lab Results   Component Value Date     12/19/2019    K 4.6 12/19/2019     12/19/2019    CO2 31 (H) 12/19/2019    BUN 14 12/19/2019    CREATININE 1.2 12/19/2019    CALCIUM 9.9 12/19/2019    ALBUMIN 4.8 12/19/2019    ALBUMIN 4.7 12/19/2019    AST 24 12/19/2019    ALT 30 12/19/2019       Last Lipids  Lab Results   Component Value Date    CHOL 153 12/19/2019    TRIG 111 12/19/2019    HDL 46 12/19/2019    LDLCALC 84.8 12/19/2019       Last A1C  Lab Results   Component Value Date    HGBA1C 6.2 (H) 03/11/2019       Last TSH  Lab Results   Component Value Date    TSH 4.113 (H) 03/11/2019             Current Med Refills  Medication List with Changes/Refills   Current Medications    ASCORBIC ACID, VITAMIN C, (VITAMIN C) 1000 MG TABLET    Take 1,000 mg by mouth once daily.       Start Date: --        End Date: --    ATENOLOL (TENORMIN) 50 MG TABLET    TAKE ONE TABLET BY MOUTH EVERY DAY       Start Date: 1/4/2022  End Date: --    CALCIUM CARBONATE (OS-MELISSA) 600 MG CALCIUM (1,500 MG) TAB    Take 2,000 mg by mouth once.       Start Date: --        End Date: --    CYANOCOBALAMIN (VITAMIN B-12) 1000 MCG TABLET    Take 5,000 mcg by mouth once daily.       Start Date: --        End Date: --    DULOXETINE (CYMBALTA) 30 MG CAPSULE    Take 1 capsule (30 mg total) by mouth once daily.       Start Date: 1/22/2021 End Date: --    FISH OIL-OMEGA-3 FATTY ACIDS 300-1,000 MG CAPSULE    Take by mouth once daily.       Start Date: --        End Date: --    MULTIVIT-MIN/FA/LYCOPEN/LUTEIN (CENTRUM SILVER MEN ORAL)    Take by mouth.       Start Date: --        End Date: --    POTASSIUM CHLORIDE SA  "(K-DUR,KLOR-CON) 20 MEQ TABLET    Take 99 mEq by mouth 2 (two) times daily.       Start Date: --        End Date: --    SIMVASTATIN (ZOCOR) 80 MG TABLET    TAKE ONE TABLET BY MOUTH EVERY EVENING       Start Date: 12/5/2021 End Date: --    SYRINGE WITH NEEDLE (BD INTEGRA SYRINGE) 3 ML 21 GAUGE X 1 1/2" SYRG    Inject as directed       Start Date: 8/13/2019 End Date: --    TESTOSTERONE CYPIONATE (DEPOTESTOTERONE CYPIONATE) 200 MG/ML INJECTION    INJECT 1 ML INTRAMUSCULARLY EVERY 14 DAYS       Start Date: 1/7/2022  End Date: --    TIZANIDINE (ZANAFLEX) 4 MG TABLET    TAKE ONE TABLET BY MOUTH EVERY NIGHT AT BEDTIME AS NEEDED       Start Date: 11/5/2021 End Date: --                     "

## 2022-02-02 NOTE — TELEPHONE ENCOUNTER
No new care gaps identified.  Powered by [employer]. Reference number: [id number].   2/02/2022 8:01:11 AM CST

## 2022-02-16 DIAGNOSIS — G89.29 CHRONIC MUSCULOSKELETAL PAIN: ICD-10-CM

## 2022-02-16 DIAGNOSIS — F32.A DEPRESSION, UNSPECIFIED DEPRESSION TYPE: ICD-10-CM

## 2022-02-16 DIAGNOSIS — M79.18 CHRONIC MUSCULOSKELETAL PAIN: ICD-10-CM

## 2022-02-16 RX ORDER — DULOXETIN HYDROCHLORIDE 30 MG/1
CAPSULE, DELAYED RELEASE ORAL
Qty: 30 CAPSULE | Refills: 3 | Status: SHIPPED | OUTPATIENT
Start: 2022-02-16 | End: 2022-08-03

## 2022-02-16 NOTE — TELEPHONE ENCOUNTER
No new care gaps identified.  Powered by [employer]. Reference number: [id number].   2/16/2022 8:04:20 AM CST

## 2022-04-26 DIAGNOSIS — M79.10 MYALGIA: ICD-10-CM

## 2022-04-26 NOTE — TELEPHONE ENCOUNTER
Last Office Visit Info:   The patient's last visit with Donaldo Duke MD was on 1/22/2021.    The patient's last visit in current department was on Visit date not found.        Last CBC Results:   Lab Results   Component Value Date    WBC 6.74 12/19/2019    HGB 16.8 12/19/2019    HCT 51.6 12/19/2019     12/19/2019       Last CMP Results  Lab Results   Component Value Date     12/19/2019    K 4.6 12/19/2019     12/19/2019    CO2 31 (H) 12/19/2019    BUN 14 12/19/2019    CREATININE 1.2 12/19/2019    CALCIUM 9.9 12/19/2019    ALBUMIN 4.8 12/19/2019    ALBUMIN 4.7 12/19/2019    AST 24 12/19/2019    ALT 30 12/19/2019       Last Lipids  Lab Results   Component Value Date    CHOL 153 12/19/2019    TRIG 111 12/19/2019    HDL 46 12/19/2019    LDLCALC 84.8 12/19/2019       Last A1C  Lab Results   Component Value Date    HGBA1C 6.2 (H) 03/11/2019       Last TSH  Lab Results   Component Value Date    TSH 4.113 (H) 03/11/2019             Current Med Refills  Medication List with Changes/Refills   Current Medications    ASCORBIC ACID, VITAMIN C, (VITAMIN C) 1000 MG TABLET    Take 1,000 mg by mouth once daily.       Start Date: --        End Date: --    ATENOLOL (TENORMIN) 50 MG TABLET    TAKE ONE TABLET BY MOUTH EVERY DAY       Start Date: 4/11/2022 End Date: --    CALCIUM CARBONATE (OS-MELISSA) 600 MG CALCIUM (1,500 MG) TAB    Take 2,000 mg by mouth once.       Start Date: --        End Date: --    CYANOCOBALAMIN (VITAMIN B-12) 1000 MCG TABLET    Take 5,000 mcg by mouth once daily.       Start Date: --        End Date: --    DULOXETINE (CYMBALTA) 30 MG CAPSULE    TAKE ONE CAPSULE BY MOUTH ONCE DAILY       Start Date: 2/16/2022 End Date: --    FISH OIL-OMEGA-3 FATTY ACIDS 300-1,000 MG CAPSULE    Take by mouth once daily.       Start Date: --        End Date: --    MULTIVIT-MIN/FA/LYCOPEN/LUTEIN (CENTRUM SILVER MEN ORAL)    Take by mouth.       Start Date: --        End Date: --    POTASSIUM CHLORIDE SA  "(K-DUR,KLOR-CON) 20 MEQ TABLET    Take 99 mEq by mouth 2 (two) times daily.       Start Date: --        End Date: --    SIMVASTATIN (ZOCOR) 80 MG TABLET    TAKE ONE TABLET BY MOUTH EVERY EVENING       Start Date: 12/5/2021 End Date: --    SYRINGE WITH NEEDLE (BD INTEGRA SYRINGE) 3 ML 21 GAUGE X 1 1/2" SYRG    Inject as directed       Start Date: 8/13/2019 End Date: --    TESTOSTERONE CYPIONATE (DEPOTESTOTERONE CYPIONATE) 200 MG/ML INJECTION    INJECT 1 ML INTRAMUSCULARLY EVERY 14 DAYS       Start Date: 1/7/2022  End Date: --    TIZANIDINE (ZANAFLEX) 4 MG TABLET    TAKE ONE TABLET BY MOUTH EVERY NIGHT AT BEDTIME AS NEEDED       Start Date: 2/2/2022  End Date: --                     "

## 2022-04-26 NOTE — TELEPHONE ENCOUNTER
No new care gaps identified.  Powered by [employer]. Reference number: [id number].   4/26/2022 8:03:03 AM CDT

## 2022-04-27 RX ORDER — TIZANIDINE 4 MG/1
TABLET ORAL
Qty: 30 TABLET | Refills: 2 | Status: SHIPPED | OUTPATIENT
Start: 2022-04-27 | End: 2022-07-18

## 2022-07-12 DIAGNOSIS — I10 ESSENTIAL HYPERTENSION: ICD-10-CM

## 2022-07-12 RX ORDER — ATENOLOL 50 MG/1
50 TABLET ORAL DAILY
Qty: 90 TABLET | Refills: 0 | Status: SHIPPED | OUTPATIENT
Start: 2022-07-12 | End: 2024-01-08

## 2022-07-12 NOTE — TELEPHONE ENCOUNTER
Pharmacy states temp refill was given over the weekend so they really need updated rx before Dr. Duke's return.

## 2022-07-12 NOTE — TELEPHONE ENCOUNTER
No new care gaps identified.  [facility] Embedded Care Gaps. Reference number: [id number]. 7/12/2022   9:04:04 AM GOODT

## 2022-10-23 DIAGNOSIS — E29.1 MALE HYPOGONADISM: ICD-10-CM

## 2022-10-23 NOTE — TELEPHONE ENCOUNTER
No new care gaps identified.  [facility] Embedded Care Gaps. Reference number: [id number]. 10/23/2022   8:00:42 AM CDT

## 2022-10-24 NOTE — TELEPHONE ENCOUNTER
Last Office Visit Info:   The patient's last visit with Donaldo Duke MD was on 1/22/2021.    The patient's last visit in current department was on Visit date not found.        Last CBC Results:   Lab Results   Component Value Date    WBC 6.74 12/19/2019    HGB 16.8 12/19/2019    HCT 51.6 12/19/2019     12/19/2019       Last CMP Results  Lab Results   Component Value Date     12/19/2019    K 4.6 12/19/2019     12/19/2019    CO2 31 (H) 12/19/2019    BUN 14 12/19/2019    CREATININE 1.2 12/19/2019    CALCIUM 9.9 12/19/2019    ALBUMIN 4.8 12/19/2019    ALBUMIN 4.7 12/19/2019    AST 24 12/19/2019    ALT 30 12/19/2019       Last Lipids  Lab Results   Component Value Date    CHOL 153 12/19/2019    TRIG 111 12/19/2019    HDL 46 12/19/2019    LDLCALC 84.8 12/19/2019       Last A1C  Lab Results   Component Value Date    HGBA1C 6.2 (H) 03/11/2019       Last TSH  Lab Results   Component Value Date    TSH 4.113 (H) 03/11/2019             Current Med Refills  Medication List with Changes/Refills   Current Medications    ASCORBIC ACID, VITAMIN C, (VITAMIN C) 1000 MG TABLET    Take 1,000 mg by mouth once daily.       Start Date: --        End Date: --    ATENOLOL (TENORMIN) 50 MG TABLET    TAKE ONE TABLET BY MOUTH EVERY DAY       Start Date: 7/12/2022 End Date: --    ATENOLOL (TENORMIN) 50 MG TABLET    Take 1 tablet (50 mg total) by mouth once daily.       Start Date: 7/12/2022 End Date: --    CALCIUM CARBONATE (OS-MELISSA) 600 MG CALCIUM (1,500 MG) TAB    Take 2,000 mg by mouth once.       Start Date: --        End Date: --    CYANOCOBALAMIN (VITAMIN B-12) 1000 MCG TABLET    Take 5,000 mcg by mouth once daily.       Start Date: --        End Date: --    DULOXETINE (CYMBALTA) 30 MG CAPSULE    TAKE ONE CAPSULE BY MOUTH ONCE DAILY       Start Date: 8/3/2022  End Date: --    FISH OIL-OMEGA-3 FATTY ACIDS 300-1,000 MG CAPSULE    Take by mouth once daily.       Start Date: --        End Date: --     "MULTIVIT-MIN/FA/LYCOPEN/LUTEIN (CENTRUM SILVER MEN ORAL)    Take by mouth.       Start Date: --        End Date: --    POTASSIUM CHLORIDE SA (K-DUR,KLOR-CON) 20 MEQ TABLET    Take 99 mEq by mouth 2 (two) times daily.       Start Date: --        End Date: --    SIMVASTATIN (ZOCOR) 80 MG TABLET    TAKE ONE TABLET BY MOUTH EVERY EVENING       Start Date: 12/5/2021 End Date: --    SYRINGE WITH NEEDLE (BD INTEGRA SYRINGE) 3 ML 21 GAUGE X 1 1/2" SYRG    Inject as directed       Start Date: 8/13/2019 End Date: --    TESTOSTERONE CYPIONATE (DEPOTESTOTERONE CYPIONATE) 200 MG/ML INJECTION    INJECT 1 ML INTRAMUSCULARLY EVERY 14 DAYS       Start Date: 1/7/2022  End Date: --    TIZANIDINE (ZANAFLEX) 4 MG TABLET    TAKE ONE TABLET BY MOUTH EVERY NIGHT AT BEDTIME AS NEEDED       Start Date: 10/20/2022End Date: --                       "

## 2022-10-28 DIAGNOSIS — E29.1 MALE HYPOGONADISM: ICD-10-CM

## 2022-10-28 RX ORDER — TESTOSTERONE CYPIONATE 200 MG/ML
INJECTION, SOLUTION INTRAMUSCULAR
Qty: 10 ML | Refills: 2 | OUTPATIENT
Start: 2022-10-28

## 2022-10-28 RX ORDER — TESTOSTERONE CYPIONATE 200 MG/ML
INJECTION, SOLUTION INTRAMUSCULAR
Qty: 10 ML | Refills: 2 | Status: SHIPPED | OUTPATIENT
Start: 2022-10-28 | End: 2023-07-26

## 2022-10-28 NOTE — TELEPHONE ENCOUNTER
----- Message from Qiana Butcher sent at 10/28/2022  9:08 AM CDT -----  .Type: Patient Call Back    Who called:self    What is the request in detail: pt would like an update on medication refill. Please call    Can the clinic reply by [facility]?    Would the patient rather a call back or a response via [facility]? call    Best call back number:.[phone] (home)

## 2022-10-28 NOTE — TELEPHONE ENCOUNTER
No new care gaps identified.  [facility] Embedded Care Gaps. Reference number: [id number]. 10/28/2022   10:42:33 AM GOODT

## 2023-01-06 DIAGNOSIS — I10 ESSENTIAL HYPERTENSION: ICD-10-CM

## 2023-01-06 RX ORDER — ATENOLOL 50 MG/1
50 TABLET ORAL DAILY
Qty: 90 TABLET | Refills: 3 | Status: SHIPPED | OUTPATIENT
Start: 2023-01-06 | End: 2023-03-02 | Stop reason: ALTCHOICE

## 2023-01-06 NOTE — TELEPHONE ENCOUNTER
No new care gaps identified.  [facility] Embedded Care Gaps. Reference number: [id number]. 1/06/2023   10:15:43 AM CST

## 2023-01-06 NOTE — TELEPHONE ENCOUNTER
Please see attached refill request.      Last Office Visit Info:   The patient's last visit with Donaldo Duke MD was on 1/22/2021.    The patient's last visit in current department was on Visit date not found.        Last CBC Results:   Lab Results   Component Value Date    WBC 6.74 12/19/2019    HGB 16.8 12/19/2019    HCT 51.6 12/19/2019     12/19/2019       Last CMP Results  Lab Results   Component Value Date     12/19/2019    K 4.6 12/19/2019     12/19/2019    CO2 31 (H) 12/19/2019    BUN 14 12/19/2019    CREATININE 1.2 12/19/2019    CALCIUM 9.9 12/19/2019    ALBUMIN 4.8 12/19/2019    ALBUMIN 4.7 12/19/2019    AST 24 12/19/2019    ALT 30 12/19/2019       Last Lipids  Lab Results   Component Value Date    CHOL 153 12/19/2019    TRIG 111 12/19/2019    HDL 46 12/19/2019    LDLCALC 84.8 12/19/2019       Last A1C  Lab Results   Component Value Date    HGBA1C 6.2 (H) 03/11/2019       Last TSH  Lab Results   Component Value Date    TSH 4.113 (H) 03/11/2019             Current Med Refills  Medication List with Changes/Refills   Current Medications    ASCORBIC ACID, VITAMIN C, (VITAMIN C) 1000 MG TABLET    Take 1,000 mg by mouth once daily.       Start Date: --        End Date: --    ATENOLOL (TENORMIN) 50 MG TABLET    TAKE ONE TABLET BY MOUTH EVERY DAY       Start Date: 7/12/2022 End Date: --    ATENOLOL (TENORMIN) 50 MG TABLET    Take 1 tablet (50 mg total) by mouth once daily.       Start Date: 7/12/2022 End Date: --    CALCIUM CARBONATE (OS-MELISSA) 600 MG CALCIUM (1,500 MG) TAB    Take 2,000 mg by mouth once.       Start Date: --        End Date: --    CYANOCOBALAMIN (VITAMIN B-12) 1000 MCG TABLET    Take 5,000 mcg by mouth once daily.       Start Date: --        End Date: --    DULOXETINE (CYMBALTA) 30 MG CAPSULE    TAKE ONE CAPSULE BY MOUTH ONCE DAILY       Start Date: 12/23/2022End Date: --    FISH OIL-OMEGA-3 FATTY ACIDS 300-1,000 MG CAPSULE    Take by mouth once daily.       Start Date: --         "End Date: --    MULTIVIT-MIN/FA/LYCOPEN/LUTEIN (CENTRUM SILVER MEN ORAL)    Take by mouth.       Start Date: --        End Date: --    POTASSIUM CHLORIDE SA (K-DUR,KLOR-CON) 20 MEQ TABLET    Take 99 mEq by mouth 2 (two) times daily.       Start Date: --        End Date: --    SIMVASTATIN (ZOCOR) 80 MG TABLET    TAKE ONE TABLET BY MOUTH EVERY EVENING       Start Date: 12/2/2022 End Date: --    SYRINGE WITH NEEDLE (BD INTEGRA SYRINGE) 3 ML 21 GAUGE X 1 1/2" SYRG    Inject as directed       Start Date: 8/13/2019 End Date: --    TESTOSTERONE CYPIONATE (DEPOTESTOTERONE CYPIONATE) 200 MG/ML INJECTION    INJECT 1 ML INTRAMUSCULARLY EVERY 14 DAYS       Start Date: 10/28/2022End Date: --    TIZANIDINE (ZANAFLEX) 4 MG TABLET    TAKE ONE TABLET BY MOUTH EVERY NIGHT AT BEDTIME AS NEEDED       Start Date: 10/20/2022End Date: --     "

## 2023-01-06 NOTE — TELEPHONE ENCOUNTER
----- Message from Lizzette Rosario sent at 1/6/2023 10:09 AM CST -----  Type: RX Refill Request    Who Called: Self     Have you contacted your pharmacy: yes     Refill or New Rx:Refill     RX Name and Strength:atenoloL (TENORMIN) 50 MG tablet 90 tablet     Preferred Pharmacy with phone number:[facility] - [place], [place] - [address]   Phone: [phone]  Fax:  [phone]    Local or Mail Order: local     Would the patient rather a call back or a response via [facility]? Call     Best Call Back Number: [phone] (home)

## 2023-02-15 DIAGNOSIS — I10 HYPERTENSION: ICD-10-CM

## 2023-02-28 ENCOUNTER — PATIENT OUTREACH (OUTPATIENT)
Dept: ADMINISTRATIVE | Facility: HOSPITAL | Age: 58
End: 2023-02-28
Payer: COMMERCIAL

## 2023-02-28 NOTE — PROGRESS NOTES
Health Maintenance Due   Topic Date Due    COVID-19 Vaccine (1) Never done    HIV Screening  Never done    TETANUS VACCINE  Never done    Shingles Vaccine (1 of 2) Never done    Hemoglobin A1c (Prediabetes)  03/11/2020     Chart review done.  updated. Immunizations reviewed & updated. Care Everywhere updated.

## 2023-03-02 ENCOUNTER — OFFICE VISIT (OUTPATIENT)
Dept: FAMILY MEDICINE | Facility: CLINIC | Age: 58
End: 2023-03-02
Payer: COMMERCIAL

## 2023-03-02 VITALS
HEART RATE: 76 BPM | BODY MASS INDEX: 27.11 KG/M2 | HEIGHT: 69 IN | OXYGEN SATURATION: 97 % | DIASTOLIC BLOOD PRESSURE: 76 MMHG | SYSTOLIC BLOOD PRESSURE: 130 MMHG | WEIGHT: 183 LBS | TEMPERATURE: 98 F

## 2023-03-02 DIAGNOSIS — M25.562 CHRONIC PAIN OF LEFT KNEE: ICD-10-CM

## 2023-03-02 DIAGNOSIS — R79.89 LOW TESTOSTERONE IN MALE: ICD-10-CM

## 2023-03-02 DIAGNOSIS — I10 HYPERTENSION, UNSPECIFIED TYPE: ICD-10-CM

## 2023-03-02 DIAGNOSIS — R73.03 PREDIABETES: ICD-10-CM

## 2023-03-02 DIAGNOSIS — G89.29 CHRONIC PAIN OF LEFT KNEE: ICD-10-CM

## 2023-03-02 PROCEDURE — 99999 PR PBB SHADOW E&M-EST. PATIENT-LVL IV: ICD-10-PCS | Mod: PBBFAC,,, | Performed by: FAMILY MEDICINE

## 2023-03-02 PROCEDURE — 99396 PR PREVENTIVE VISIT,EST,40-64: ICD-10-PCS | Mod: S$GLB,,, | Performed by: FAMILY MEDICINE

## 2023-03-02 PROCEDURE — 99396 PREV VISIT EST AGE 40-64: CPT | Mod: S$GLB,,, | Performed by: FAMILY MEDICINE

## 2023-03-02 PROCEDURE — 99999 PR PBB SHADOW E&M-EST. PATIENT-LVL IV: CPT | Mod: PBBFAC,,, | Performed by: FAMILY MEDICINE

## 2023-03-02 NOTE — PROGRESS NOTES
"HISTORY OF PRESENT ILLNESS:  Familia Mccarthy is a 57 y.o. male who presents to the clinic today for Annual Exam  .     Wellness labs  Potential medication side effects were discussed with the patient; let me know if any occur.        Patient Active Problem List   Diagnosis    Hypertension    Hyperlipidemia    Hypertriglyceridemia    Prediabetes    Chronic pain of left knee and right knee, with past surgeries    Low testosterone in male    Chronic musculoskeletal pain    Depression           CARE TEAM:  Patient Care Team:  Donaldo Duke MD as PCP - General (Family Medicine)         Review of Systems   Constitutional: Negative.    HENT: Negative.     Eyes: Negative.    Respiratory: Negative.     Cardiovascular: Negative.    Gastrointestinal: Negative.    Genitourinary: Negative.    Musculoskeletal:  Positive for joint pain and myalgias.   Skin: Negative.    Neurological: Negative.    Endo/Heme/Allergies: Negative.    Psychiatric/Behavioral: Negative.           PHYSICAL EXAM:  /76   Pulse 76   Temp 97.9 °F (36.6 °C) (Oral)   Ht 5' 9" (1.753 m)   Wt 83 kg (182 lb 15.7 oz)   SpO2 97%   BMI 27.02 kg/m²   Wt Readings from Last 5 Encounters:   03/02/23 83 kg (182 lb 15.7 oz)   01/22/21 93 kg (205 lb 0.4 oz)   12/12/19 92.4 kg (203 lb 11.3 oz)   08/13/19 91 kg (200 lb 9.9 oz)   07/10/19 93.7 kg (206 lb 9.1 oz)     BP Readings from Last 5 Encounters:   03/02/23 130/76   01/22/21 116/62   12/12/19 136/88   07/02/19 132/72   06/03/19 138/86           He appears well, in no apparent distress.  Alert and oriented times three, pleasant and cooperative. Vital signs are as documented in vital signs section.  S1 and S2 normal, no murmurs, clicks, gallops or rubs. Regular rate and rhythm. Chest is clear; no wheezes or rales. No edema or JVD.        Medication List with Changes/Refills   Current Medications    ASCORBIC ACID, VITAMIN C, (VITAMIN C) 1000 MG TABLET    Take 1,000 mg by mouth once daily.    ATENOLOL " "(TENORMIN) 50 MG TABLET    Take 1 tablet (50 mg total) by mouth once daily.    CALCIUM CARBONATE (OS-MELISSA) 600 MG CALCIUM (1,500 MG) TAB    Take 2,000 mg by mouth once.    CYANOCOBALAMIN (VITAMIN B-12) 1000 MCG TABLET    Take 5,000 mcg by mouth once daily.    DULOXETINE (CYMBALTA) 30 MG CAPSULE    TAKE ONE CAPSULE BY MOUTH ONCE DAILY    FISH OIL-OMEGA-3 FATTY ACIDS 300-1,000 MG CAPSULE    Take by mouth once daily.    MULTIVIT-MIN/FA/LYCOPEN/LUTEIN (CENTRUM SILVER MEN ORAL)    Take by mouth.    POTASSIUM CHLORIDE SA (K-DUR,KLOR-CON) 20 MEQ TABLET    Take 99 mEq by mouth 2 (two) times daily.    SIMVASTATIN (ZOCOR) 80 MG TABLET    TAKE ONE TABLET BY MOUTH EVERY EVENING    SYRINGE WITH NEEDLE (BD INTEGRA SYRINGE) 3 ML 21 GAUGE X 1 1/2" SYRG    Inject as directed    TESTOSTERONE CYPIONATE (DEPOTESTOTERONE CYPIONATE) 200 MG/ML INJECTION    INJECT 1 ML INTRAMUSCULARLY EVERY 14 DAYS    TIZANIDINE (ZANAFLEX) 4 MG TABLET    TAKE ONE TABLET BY MOUTH EVERY NIGHT AT BEDTIME AS NEEDED   Discontinued Medications    ATENOLOL (TENORMIN) 50 MG TABLET    Take 1 tablet (50 mg total) by mouth once daily.       ASSESSMENT AND PLAN:    Problem List Items Addressed This Visit       Hypertension    Current Assessment & Plan     The current medical regimen is effective;  continue present plan and medications.           Prediabetes    Current Assessment & Plan     The current medical regimen is effective;  continue present plan and medications.           Chronic pain of left knee and right knee, with past surgeries    Overview     Operated on in the past  Still with some pain         Current Assessment & Plan     The current medical regimen is effective;  continue present plan and medications.           Low testosterone in male    Current Assessment & Plan     The current medical regimen is effective;  continue present plan and medications.              No future appointments.    No follow-ups on file. or sooner as needed.          "

## 2023-03-06 ENCOUNTER — LAB VISIT (OUTPATIENT)
Dept: LAB | Facility: HOSPITAL | Age: 58
End: 2023-03-06
Attending: FAMILY MEDICINE
Payer: COMMERCIAL

## 2023-03-06 DIAGNOSIS — I10 HYPERTENSION: ICD-10-CM

## 2023-03-06 LAB
ALBUMIN SERPL BCP-MCNC: 4.4 G/DL (ref 3.5–5.2)
ALP SERPL-CCNC: 81 U/L (ref 55–135)
ALT SERPL W/O P-5'-P-CCNC: 28 U/L (ref 10–44)
ANION GAP SERPL CALC-SCNC: 11 MMOL/L (ref 8–16)
AST SERPL-CCNC: 35 U/L (ref 10–40)
BILIRUB SERPL-MCNC: 0.4 MG/DL (ref 0.1–1)
BUN SERPL-MCNC: 13 MG/DL (ref 6–20)
CALCIUM SERPL-MCNC: 9.4 MG/DL (ref 8.7–10.5)
CHLORIDE SERPL-SCNC: 106 MMOL/L (ref 95–110)
CO2 SERPL-SCNC: 23 MMOL/L (ref 23–29)
CREAT SERPL-MCNC: 0.9 MG/DL (ref 0.5–1.4)
EST. GFR  (NO RACE VARIABLE): >60 ML/MIN/1.73 M^2
GLUCOSE SERPL-MCNC: 140 MG/DL (ref 70–110)
POTASSIUM SERPL-SCNC: 4 MMOL/L (ref 3.5–5.1)
PROT SERPL-MCNC: 7.7 G/DL (ref 6–8.4)
SODIUM SERPL-SCNC: 140 MMOL/L (ref 136–145)

## 2023-03-06 PROCEDURE — 36415 COLL VENOUS BLD VENIPUNCTURE: CPT | Mod: PO | Performed by: FAMILY MEDICINE

## 2023-03-06 PROCEDURE — 80053 COMPREHEN METABOLIC PANEL: CPT | Performed by: FAMILY MEDICINE

## 2023-03-08 ENCOUNTER — TELEPHONE (OUTPATIENT)
Dept: FAMILY MEDICINE | Facility: CLINIC | Age: 58
End: 2023-03-08
Payer: COMMERCIAL

## 2023-03-08 NOTE — TELEPHONE ENCOUNTER
----- Message from Candace Astorga sent at 3/8/2023  7:34 AM CST -----  Type: Patient Call Back    Who called: self    What is the request in detail: patient stated that Dr edward ordered other lab work and the lab did not complete the order. Please call    Can the clinic reply by [facility]? no    Would the patient rather a call back or a response via [facility]?   call    Best call back number: .[phone] (home)      Additional Information:

## 2023-03-09 ENCOUNTER — LAB VISIT (OUTPATIENT)
Dept: LAB | Facility: HOSPITAL | Age: 58
End: 2023-03-09
Attending: FAMILY MEDICINE
Payer: COMMERCIAL

## 2023-03-09 DIAGNOSIS — Z00.00 ANNUAL PHYSICAL EXAM: Primary | ICD-10-CM

## 2023-03-09 DIAGNOSIS — Z00.00 ANNUAL PHYSICAL EXAM: ICD-10-CM

## 2023-03-09 LAB
ALBUMIN SERPL BCP-MCNC: 4.3 G/DL (ref 3.5–5.2)
ALP SERPL-CCNC: 75 U/L (ref 55–135)
ALT SERPL W/O P-5'-P-CCNC: 28 U/L (ref 10–44)
ANION GAP SERPL CALC-SCNC: 9 MMOL/L (ref 8–16)
AST SERPL-CCNC: 35 U/L (ref 10–40)
BASOPHILS # BLD AUTO: 0.07 K/UL (ref 0–0.2)
BASOPHILS NFR BLD: 0.9 % (ref 0–1.9)
BILIRUB SERPL-MCNC: 0.8 MG/DL (ref 0.1–1)
BILIRUB UR QL STRIP: NEGATIVE
BUN SERPL-MCNC: 15 MG/DL (ref 6–20)
CALCIUM SERPL-MCNC: 9.6 MG/DL (ref 8.7–10.5)
CHLORIDE SERPL-SCNC: 106 MMOL/L (ref 95–110)
CHOLEST SERPL-MCNC: 144 MG/DL (ref 120–199)
CHOLEST/HDLC SERPL: 3.9 {RATIO} (ref 2–5)
CLARITY UR: CLEAR
CO2 SERPL-SCNC: 26 MMOL/L (ref 23–29)
COLOR UR: YELLOW
COMPLEXED PSA SERPL-MCNC: 1.8 NG/ML (ref 0–4)
CREAT SERPL-MCNC: 1 MG/DL (ref 0.5–1.4)
DIFFERENTIAL METHOD: ABNORMAL
EOSINOPHIL # BLD AUTO: 0.3 K/UL (ref 0–0.5)
EOSINOPHIL NFR BLD: 3.5 % (ref 0–8)
ERYTHROCYTE [DISTWIDTH] IN BLOOD BY AUTOMATED COUNT: 13.1 % (ref 11.5–14.5)
EST. GFR  (NO RACE VARIABLE): >60 ML/MIN/1.73 M^2
ESTIMATED AVG GLUCOSE: 117 MG/DL (ref 68–131)
GLUCOSE SERPL-MCNC: 131 MG/DL (ref 70–110)
GLUCOSE UR QL STRIP: NEGATIVE
HBA1C MFR BLD: 5.7 % (ref 4–5.6)
HCT VFR BLD AUTO: 47.7 % (ref 40–54)
HDLC SERPL-MCNC: 37 MG/DL (ref 40–75)
HDLC SERPL: 25.7 % (ref 20–50)
HGB BLD-MCNC: 15.8 G/DL (ref 14–18)
HGB UR QL STRIP: NEGATIVE
IMM GRANULOCYTES # BLD AUTO: 0.02 K/UL (ref 0–0.04)
IMM GRANULOCYTES NFR BLD AUTO: 0.3 % (ref 0–0.5)
KETONES UR QL STRIP: NEGATIVE
LDLC SERPL CALC-MCNC: 79 MG/DL (ref 63–159)
LEUKOCYTE ESTERASE UR QL STRIP: NEGATIVE
LYMPHOCYTES # BLD AUTO: 2 K/UL (ref 1–4.8)
LYMPHOCYTES NFR BLD: 25.9 % (ref 18–48)
MCH RBC QN AUTO: 31.5 PG (ref 27–31)
MCHC RBC AUTO-ENTMCNC: 33.1 G/DL (ref 32–36)
MCV RBC AUTO: 95 FL (ref 82–98)
MONOCYTES # BLD AUTO: 0.6 K/UL (ref 0.3–1)
MONOCYTES NFR BLD: 7.6 % (ref 4–15)
NEUTROPHILS # BLD AUTO: 4.8 K/UL (ref 1.8–7.7)
NEUTROPHILS NFR BLD: 61.8 % (ref 38–73)
NITRITE UR QL STRIP: NEGATIVE
NONHDLC SERPL-MCNC: 107 MG/DL
NRBC BLD-RTO: 0 /100 WBC
PH UR STRIP: 6 [PH] (ref 5–8)
PLATELET # BLD AUTO: 249 K/UL (ref 150–450)
PMV BLD AUTO: 9.8 FL (ref 9.2–12.9)
POTASSIUM SERPL-SCNC: 4.1 MMOL/L (ref 3.5–5.1)
PROT SERPL-MCNC: 7.6 G/DL (ref 6–8.4)
PROT UR QL STRIP: NEGATIVE
RBC # BLD AUTO: 5.02 M/UL (ref 4.6–6.2)
SODIUM SERPL-SCNC: 141 MMOL/L (ref 136–145)
SP GR UR STRIP: 1.01 (ref 1–1.03)
T4 FREE SERPL-MCNC: 0.68 NG/DL (ref 0.71–1.51)
TESTOST SERPL-MCNC: 963 NG/DL (ref 304–1227)
TRIGL SERPL-MCNC: 140 MG/DL (ref 30–150)
TSH SERPL DL<=0.005 MIU/L-ACNC: 4.56 UIU/ML (ref 0.4–4)
URN SPEC COLLECT METH UR: NORMAL
UROBILINOGEN UR STRIP-ACNC: NEGATIVE EU/DL
WBC # BLD AUTO: 7.72 K/UL (ref 3.9–12.7)

## 2023-03-09 PROCEDURE — 83036 HEMOGLOBIN GLYCOSYLATED A1C: CPT | Performed by: FAMILY MEDICINE

## 2023-03-09 PROCEDURE — 80053 COMPREHEN METABOLIC PANEL: CPT | Performed by: FAMILY MEDICINE

## 2023-03-09 PROCEDURE — 80061 LIPID PANEL: CPT | Performed by: FAMILY MEDICINE

## 2023-03-09 PROCEDURE — 81003 URINALYSIS AUTO W/O SCOPE: CPT | Performed by: FAMILY MEDICINE

## 2023-03-09 PROCEDURE — 85025 COMPLETE CBC W/AUTO DIFF WBC: CPT | Performed by: FAMILY MEDICINE

## 2023-03-09 PROCEDURE — 36415 COLL VENOUS BLD VENIPUNCTURE: CPT | Mod: PO | Performed by: FAMILY MEDICINE

## 2023-03-09 PROCEDURE — 84403 ASSAY OF TOTAL TESTOSTERONE: CPT | Performed by: FAMILY MEDICINE

## 2023-03-09 PROCEDURE — 84153 ASSAY OF PSA TOTAL: CPT | Performed by: FAMILY MEDICINE

## 2023-03-09 PROCEDURE — 84443 ASSAY THYROID STIM HORMONE: CPT | Performed by: FAMILY MEDICINE

## 2023-03-09 PROCEDURE — 84439 ASSAY OF FREE THYROXINE: CPT | Performed by: FAMILY MEDICINE

## 2023-03-27 DIAGNOSIS — R79.89 ELEVATED TSH: Primary | ICD-10-CM

## 2023-05-19 DIAGNOSIS — F32.A DEPRESSION, UNSPECIFIED DEPRESSION TYPE: ICD-10-CM

## 2023-05-19 DIAGNOSIS — M79.18 CHRONIC MUSCULOSKELETAL PAIN: ICD-10-CM

## 2023-05-19 DIAGNOSIS — G89.29 CHRONIC MUSCULOSKELETAL PAIN: ICD-10-CM

## 2023-05-19 RX ORDER — DULOXETIN HYDROCHLORIDE 30 MG/1
CAPSULE, DELAYED RELEASE ORAL
Qty: 90 CAPSULE | Refills: 3 | Status: SHIPPED | OUTPATIENT
Start: 2023-05-19

## 2023-05-19 NOTE — TELEPHONE ENCOUNTER
Refill Decision Note   Familia Mccarthy  is requesting a refill authorization.  Brief Assessment and Rationale for Refill:  Approve     Medication Therapy Plan:         Comments:     Note composed:10:32 AM 05/19/2023

## 2023-05-19 NOTE — TELEPHONE ENCOUNTER
No care due was identified.  Health [facility] Embedded Care Due Messages. Reference number: [id number].   5/19/2023 8:01:51 AM CDT

## 2023-07-02 DIAGNOSIS — M79.10 MYALGIA: ICD-10-CM

## 2023-07-02 NOTE — TELEPHONE ENCOUNTER
No care due was identified.  Health [facility] Embedded Care Due Messages. Reference number: [id number].   7/02/2023 8:03:46 AM CDT

## 2023-07-03 RX ORDER — TIZANIDINE 4 MG/1
TABLET ORAL
Qty: 30 TABLET | Refills: 2 | Status: SHIPPED | OUTPATIENT
Start: 2023-07-03 | End: 2023-09-27

## 2023-07-26 DIAGNOSIS — E29.1 MALE HYPOGONADISM: ICD-10-CM

## 2023-07-26 RX ORDER — TESTOSTERONE CYPIONATE 200 MG/ML
INJECTION, SOLUTION INTRAMUSCULAR
Qty: 10 ML | Refills: 2 | Status: SHIPPED | OUTPATIENT
Start: 2023-07-26

## 2023-07-26 NOTE — TELEPHONE ENCOUNTER
No care due was identified.  [facility] Embedded Care Due Messages. Reference number: [id number].   7/26/2023 8:16:52 AM CDT

## 2023-09-27 DIAGNOSIS — M79.10 MYALGIA: ICD-10-CM

## 2023-09-27 RX ORDER — TIZANIDINE 4 MG/1
TABLET ORAL
Qty: 30 TABLET | Refills: 2 | Status: SHIPPED | OUTPATIENT
Start: 2023-09-27 | End: 2024-01-08

## 2023-09-27 NOTE — TELEPHONE ENCOUNTER
No care due was identified.  Health [facility] Embedded Care Due Messages. Reference number: [id number].   9/27/2023 8:04:59 AM CDT

## 2023-12-01 DIAGNOSIS — E78.5 HYPERLIPIDEMIA, UNSPECIFIED HYPERLIPIDEMIA TYPE: ICD-10-CM

## 2023-12-01 RX ORDER — SIMVASTATIN 80 MG/1
TABLET, FILM COATED ORAL
Qty: 90 TABLET | Refills: 1 | Status: SHIPPED | OUTPATIENT
Start: 2023-12-01

## 2023-12-01 NOTE — TELEPHONE ENCOUNTER
Refill Decision Note   Familia Mccarthy  is requesting a refill authorization.  Brief Assessment and Rationale for Refill:  Approve     Medication Therapy Plan:         Comments:     Note composed:3:41 PM 12/01/2023             Appointments     Last Visit   3/2/2023 Donaldo Duke MD   Next Visit   Visit date not found Donaldo Duke MD

## 2023-12-01 NOTE — TELEPHONE ENCOUNTER
No care due was identified.  Health [facility] Embedded Care Due Messages. Reference number: [id number].   12/01/2023 8:01:37 AM CST

## 2024-01-06 DIAGNOSIS — M79.10 MYALGIA: ICD-10-CM

## 2024-01-06 DIAGNOSIS — I10 ESSENTIAL HYPERTENSION: ICD-10-CM

## 2024-01-06 NOTE — TELEPHONE ENCOUNTER
Care Due:                  Date            Visit Type   Department     Provider  --------------------------------------------------------------------------------                                [facility] FAMILY                              PRIMARY      MEDICINE/INTERN  Last Visit: 03-      CARE ([facility])   [facility]         Donaldo Duke  Next Visit: None Scheduled  None         None Found                                                            Last  Test          Frequency    Reason                     Performed    Due Date  --------------------------------------------------------------------------------    CMP.........  12 months..  simvastatin..............  03- 03-    Lipid Panel.  12 months..  simvastatin..............  03- 03-    [facility] [employer] Embedded Care Due Messages. Reference number: [id number].   1/06/2024 9:14:05 AM CST

## 2024-01-08 RX ORDER — TIZANIDINE 4 MG/1
TABLET ORAL
Qty: 30 TABLET | Refills: 2 | Status: SHIPPED | OUTPATIENT
Start: 2024-01-08

## 2024-01-08 RX ORDER — ATENOLOL 50 MG/1
50 TABLET ORAL
Qty: 90 TABLET | Refills: 0 | Status: SHIPPED | OUTPATIENT
Start: 2024-01-08

## 2024-01-08 NOTE — TELEPHONE ENCOUNTER
Refill Routing Note   Medication(s) are not appropriate for processing by [facility] for the following reason(s):        Outside of protocol    [facility] action(s):  Route  Approve     Requires labs : Yes             Appointments  past 12m or future 3m with PCP    Date Provider   Last Visit   3/2/2023 Donaldo Duke MD   Next Visit   Visit date not found Donaldo Duke MD   ED visits in past 90 days: 0        Note composed:8:39 AM 01/08/2024

## 2024-03-08 ENCOUNTER — OFFICE VISIT (OUTPATIENT)
Dept: FAMILY MEDICINE | Facility: CLINIC | Age: 59
End: 2024-03-08
Payer: COMMERCIAL

## 2024-03-08 ENCOUNTER — LAB VISIT (OUTPATIENT)
Dept: LAB | Facility: HOSPITAL | Age: 59
End: 2024-03-08
Attending: FAMILY MEDICINE
Payer: COMMERCIAL

## 2024-03-08 VITALS
HEIGHT: 68 IN | BODY MASS INDEX: 27.06 KG/M2 | DIASTOLIC BLOOD PRESSURE: 88 MMHG | OXYGEN SATURATION: 97 % | WEIGHT: 178.56 LBS | SYSTOLIC BLOOD PRESSURE: 138 MMHG | TEMPERATURE: 98 F | HEART RATE: 89 BPM

## 2024-03-08 DIAGNOSIS — Z00.00 ANNUAL PHYSICAL EXAM: Primary | ICD-10-CM

## 2024-03-08 DIAGNOSIS — Z00.00 ANNUAL PHYSICAL EXAM: ICD-10-CM

## 2024-03-08 DIAGNOSIS — R29.898 WEAKNESS OF LOWER EXTREMITY, UNSPECIFIED LATERALITY: ICD-10-CM

## 2024-03-08 LAB
ALBUMIN SERPL BCP-MCNC: 4.5 G/DL (ref 3.5–5.2)
ALP SERPL-CCNC: 68 U/L (ref 55–135)
ALT SERPL W/O P-5'-P-CCNC: 24 U/L (ref 10–44)
ANION GAP SERPL CALC-SCNC: 10 MMOL/L (ref 8–16)
AST SERPL-CCNC: 26 U/L (ref 10–40)
BASOPHILS # BLD AUTO: 0.07 K/UL (ref 0–0.2)
BASOPHILS NFR BLD: 0.8 % (ref 0–1.9)
BILIRUB SERPL-MCNC: 1 MG/DL (ref 0.1–1)
BILIRUB UR QL STRIP: NEGATIVE
BUN SERPL-MCNC: 8 MG/DL (ref 6–20)
CALCIUM SERPL-MCNC: 10 MG/DL (ref 8.7–10.5)
CHLORIDE SERPL-SCNC: 103 MMOL/L (ref 95–110)
CHOLEST SERPL-MCNC: 128 MG/DL (ref 120–199)
CHOLEST/HDLC SERPL: 3.6 {RATIO} (ref 2–5)
CLARITY UR: CLEAR
CO2 SERPL-SCNC: 27 MMOL/L (ref 23–29)
COLOR UR: YELLOW
CREAT SERPL-MCNC: 1 MG/DL (ref 0.5–1.4)
DIFFERENTIAL METHOD BLD: NORMAL
EOSINOPHIL # BLD AUTO: 0.2 K/UL (ref 0–0.5)
EOSINOPHIL NFR BLD: 2.2 % (ref 0–8)
ERYTHROCYTE [DISTWIDTH] IN BLOOD BY AUTOMATED COUNT: 13.1 % (ref 11.5–14.5)
EST. GFR  (NO RACE VARIABLE): >60 ML/MIN/1.73 M^2
GLUCOSE SERPL-MCNC: 97 MG/DL (ref 70–110)
GLUCOSE UR QL STRIP: NEGATIVE
HCT VFR BLD AUTO: 45.8 % (ref 40–54)
HDLC SERPL-MCNC: 36 MG/DL (ref 40–75)
HDLC SERPL: 28.1 % (ref 20–50)
HGB BLD-MCNC: 14.8 G/DL (ref 14–18)
HGB UR QL STRIP: NEGATIVE
IMM GRANULOCYTES # BLD AUTO: 0.03 K/UL (ref 0–0.04)
IMM GRANULOCYTES NFR BLD AUTO: 0.3 % (ref 0–0.5)
KETONES UR QL STRIP: ABNORMAL
LDLC SERPL CALC-MCNC: 53.4 MG/DL (ref 63–159)
LEUKOCYTE ESTERASE UR QL STRIP: NEGATIVE
LYMPHOCYTES # BLD AUTO: 2.2 K/UL (ref 1–4.8)
LYMPHOCYTES NFR BLD: 25.2 % (ref 18–48)
MCH RBC QN AUTO: 30.7 PG (ref 27–31)
MCHC RBC AUTO-ENTMCNC: 32.3 G/DL (ref 32–36)
MCV RBC AUTO: 95 FL (ref 82–98)
MONOCYTES # BLD AUTO: 0.6 K/UL (ref 0.3–1)
MONOCYTES NFR BLD: 7.1 % (ref 4–15)
NEUTROPHILS # BLD AUTO: 5.6 K/UL (ref 1.8–7.7)
NEUTROPHILS NFR BLD: 64.4 % (ref 38–73)
NITRITE UR QL STRIP: NEGATIVE
NONHDLC SERPL-MCNC: 92 MG/DL
NRBC BLD-RTO: 0 /100 WBC
PH UR STRIP: 7 [PH] (ref 5–8)
PLATELET # BLD AUTO: 262 K/UL (ref 150–450)
PMV BLD AUTO: 10.1 FL (ref 9.2–12.9)
POTASSIUM SERPL-SCNC: 3.6 MMOL/L (ref 3.5–5.1)
PROT SERPL-MCNC: 8 G/DL (ref 6–8.4)
PROT UR QL STRIP: NEGATIVE
RBC # BLD AUTO: 4.82 M/UL (ref 4.6–6.2)
SODIUM SERPL-SCNC: 140 MMOL/L (ref 136–145)
SP GR UR STRIP: 1.01 (ref 1–1.03)
TRIGL SERPL-MCNC: 193 MG/DL (ref 30–150)
TSH SERPL DL<=0.005 MIU/L-ACNC: 2.75 UIU/ML (ref 0.4–4)
URN SPEC COLLECT METH UR: ABNORMAL
UROBILINOGEN UR STRIP-ACNC: NEGATIVE EU/DL
WBC # BLD AUTO: 8.68 K/UL (ref 3.9–12.7)

## 2024-03-08 PROCEDURE — 99999 PR PBB SHADOW E&M-EST. PATIENT-LVL IV: CPT | Mod: PBBFAC,,, | Performed by: FAMILY MEDICINE

## 2024-03-08 PROCEDURE — 36415 COLL VENOUS BLD VENIPUNCTURE: CPT | Mod: PO | Performed by: FAMILY MEDICINE

## 2024-03-08 PROCEDURE — 99396 PREV VISIT EST AGE 40-64: CPT | Mod: S$GLB,,, | Performed by: FAMILY MEDICINE

## 2024-03-08 PROCEDURE — 84153 ASSAY OF PSA TOTAL: CPT | Performed by: FAMILY MEDICINE

## 2024-03-08 PROCEDURE — 81003 URINALYSIS AUTO W/O SCOPE: CPT | Performed by: FAMILY MEDICINE

## 2024-03-08 PROCEDURE — 84443 ASSAY THYROID STIM HORMONE: CPT | Performed by: FAMILY MEDICINE

## 2024-03-08 PROCEDURE — 85025 COMPLETE CBC W/AUTO DIFF WBC: CPT | Performed by: FAMILY MEDICINE

## 2024-03-08 PROCEDURE — 80053 COMPREHEN METABOLIC PANEL: CPT | Performed by: FAMILY MEDICINE

## 2024-03-08 PROCEDURE — 80061 LIPID PANEL: CPT | Performed by: FAMILY MEDICINE

## 2024-03-08 PROCEDURE — 84403 ASSAY OF TOTAL TESTOSTERONE: CPT | Performed by: FAMILY MEDICINE

## 2024-03-08 PROCEDURE — 83036 HEMOGLOBIN GLYCOSYLATED A1C: CPT | Performed by: FAMILY MEDICINE

## 2024-03-08 PROCEDURE — 87389 HIV-1 AG W/HIV-1&-2 AB AG IA: CPT | Performed by: FAMILY MEDICINE

## 2024-03-08 NOTE — PROGRESS NOTES
"Chief Complaint   Patient presents with    Annual Exam       SUBJECTIVE:   Familia Mccarthy is a 58 y.o. male presenting for his annual checkup.   Current Outpatient Medications   Medication Sig Dispense Refill    ascorbic acid, vitamin C, (VITAMIN C) 1000 MG tablet Take 1,000 mg by mouth once daily.      atenoloL (TENORMIN) 50 MG tablet TAKE 1 TABLET BY MOUTH EVERY DAY 90 tablet 0    calcium carbonate (OS-MELISSA) 600 mg calcium (1,500 mg) Tab Take 2,000 mg by mouth once.      cyanocobalamin (VITAMIN B-12) 1000 MCG tablet Take 5,000 mcg by mouth once daily.      DULoxetine (CYMBALTA) 30 MG capsule TAKE ONE CAPSULE BY MOUTH ONCE DAILY 90 capsule 3    fish oil-omega-3 fatty acids 300-1,000 mg capsule Take by mouth once daily.      multivit-min/FA/lycopen/lutein (CENTRUM SILVER MEN ORAL) Take by mouth.      potassium chloride SA (K-DUR,KLOR-CON) 20 MEQ tablet Take 99 mEq by mouth 2 (two) times daily.      simvastatin (ZOCOR) 80 MG tablet TAKE ONE TABLET BY MOUTH EVERY EVENING 90 tablet 1    syringe with needle (BD INTEGRA SYRINGE) 3 mL 21 gauge x 1 1/2" Syrg Inject as directed 100 Syringe 0    testosterone cypionate (DEPOTESTOTERONE CYPIONATE) 200 mg/mL injection INJECT 1 ML INTRAMUSCULARLY EVERY 14 DAYS 10 mL 2    tiZANidine (ZANAFLEX) 4 MG tablet TAKE ONE TABLET BY MOUTH EVERY NIGHT AT BEDTIME AS NEEDED 30 tablet 2     No current facility-administered medications for this visit.     Allergies: Patient has no known allergies.   Patient Active Problem List    Diagnosis Date Noted    Chronic musculoskeletal pain 01/28/2021    Depression 01/28/2021    Low testosterone in male 02/06/2018    Chronic pain of left knee and right knee, with past surgeries 08/10/2017    Hypertriglyceridemia 11/19/2014    Prediabetes 11/19/2014    Hypertension 07/07/2014    Hyperlipidemia 07/07/2014       ROS:  Feeling well. No dyspnea or chest pain on exertion. No abdominal pain, change in bowel habits, black or bloody stools. No urinary " "tract or prostatic symptoms. No neurological complaints.    OBJECTIVE:   The patient appears well, alert, oriented x 3, in no distress.   /88   Pulse 89   Temp 98.2 °F (36.8 °C) (Oral)   Ht 5' 8" (1.727 m)   Wt 81 kg (178 lb 9.2 oz)   SpO2 97%   BMI 27.15 kg/m²   Wt Readings from Last 5 Encounters:   03/08/24 81 kg (178 lb 9.2 oz)   03/02/23 83 kg (182 lb 15.7 oz)   01/22/21 93 kg (205 lb 0.4 oz)   12/12/19 92.4 kg (203 lb 11.3 oz)   08/13/19 91 kg (200 lb 9.9 oz)       .w    ASSESSMENT:   1. Annual physical exam          PLAN:   Counseled on age appropriate medical preventative services, including age appropriate cancer screenings, over all nutritional health, need for a consistent exercise regimen and an over all push towards maintaining a vigorous and active lifestyle.  Counseled on age appropriate vaccines and discussed upcoming health care needs based on age/gender.  Spent time with patient counseling on need for a good patient/doctor relationship moving forward.  Discussed use of common OTC medications and supplements.  Discussed common dietary aids and use of caffeine and the need for good sleep hygiene and stress management.    Problem List Items Addressed This Visit    None  Visit Diagnoses       Annual physical exam    -  Primary    Relevant Orders    CBC Auto Differential (Completed)    Comprehensive Metabolic Panel (Completed)    Urinalysis, Reflex to Urine Culture Urine, Clean Catch (Completed)    PSA, Screening    Hemoglobin A1C    Lipid Panel (Completed)    TSH (Completed)    Testosterone    HIV 1/2 Ag/Ab (4th Gen)            Continue testosterone   Watch for the leg atrophy  "

## 2024-03-09 LAB
COMPLEXED PSA SERPL-MCNC: 3.6 NG/ML (ref 0–4)
ESTIMATED AVG GLUCOSE: 114 MG/DL (ref 68–131)
HBA1C MFR BLD: 5.6 % (ref 4–5.6)
HIV 1+2 AB+HIV1 P24 AG SERPL QL IA: NORMAL
TESTOST SERPL-MCNC: 1127 NG/DL (ref 304–1227)

## 2024-03-18 ENCOUNTER — OFFICE VISIT (OUTPATIENT)
Dept: NEUROLOGY | Facility: CLINIC | Age: 59
End: 2024-03-18
Payer: COMMERCIAL

## 2024-03-18 VITALS
WEIGHT: 176.38 LBS | SYSTOLIC BLOOD PRESSURE: 150 MMHG | HEIGHT: 68 IN | BODY MASS INDEX: 26.73 KG/M2 | HEART RATE: 82 BPM | DIASTOLIC BLOOD PRESSURE: 97 MMHG

## 2024-03-18 DIAGNOSIS — M48.07 SPINAL STENOSIS, LUMBOSACRAL REGION: Primary | ICD-10-CM

## 2024-03-18 DIAGNOSIS — M62.551 ATROPHY OF MUSCLE OF RIGHT THIGH: ICD-10-CM

## 2024-03-18 DIAGNOSIS — R29.898 WEAKNESS OF LOWER EXTREMITY, UNSPECIFIED LATERALITY: ICD-10-CM

## 2024-03-18 PROCEDURE — 99999 PR PBB SHADOW E&M-EST. PATIENT-LVL IV: CPT | Mod: PBBFAC,,, | Performed by: STUDENT IN AN ORGANIZED HEALTH CARE EDUCATION/TRAINING PROGRAM

## 2024-03-18 PROCEDURE — 99203 OFFICE O/P NEW LOW 30 MIN: CPT | Mod: S$GLB,,, | Performed by: STUDENT IN AN ORGANIZED HEALTH CARE EDUCATION/TRAINING PROGRAM

## 2024-03-18 RX ORDER — LORAZEPAM 1 MG/1
TABLET ORAL
Qty: 2 TABLET | Refills: 0 | Status: SHIPPED | OUTPATIENT
Start: 2024-03-18

## 2024-03-18 NOTE — PROGRESS NOTES
Patient ID: [id number]  Referring Physician: Donaldo Duke MD    Chief Complaint/Reason for Consult: RLE weakness/numbness/muscle wasting     Subjective:     HPI  Familia Mccarthy is a 58 y.o. RH male with HTN, hyperlipidemia, and prediabetes. he is presenting today for evaluation of progressive RLE numbness and weakness.     he has had multiple knee surgeries starting in young adulthood. As the years have gone by his baseline bow-legged lower extremities have gotten worse.   Walking is difficult, has to take a break and bend his back after walking not even a block.  Reports a burning pain in the buttocks going down the posterior thighs after standing for 5-10 minutes when playing darts.  Denies falling or stumbling. Balance is good.   Denies urinary urgency or frequency, slight difficulty voiding and weaker stream. Denies change in bowel habits.   Has lost muscle mass in lower extremities. Also reports night cramps in calves.  Feet are numb x few years, however it's progressed to the calf on the right     Review of Systems   Constitutional:  Negative for unexpected weight change.   HENT:  Negative for trouble swallowing.    Eyes:  Negative for visual disturbance.   Gastrointestinal:  Negative for change in bowel habit, constipation, vomiting and fecal incontinence.   Genitourinary:  Negative for bladder incontinence and urgency.   Musculoskeletal:  Positive for back pain, gait problem and leg pain.   Neurological:  Positive for weakness and numbness. Negative for dizziness, tremors, speech difficulty and headaches.   All other systems reviewed and are negative.    Past Medical History:  Active Ambulatory Problems     Diagnosis Date Noted    Hypertension 07/07/2014    Hyperlipidemia 07/07/2014    Hypertriglyceridemia 11/19/2014    Prediabetes 11/19/2014    Chronic pain of left knee and right knee, with past surgeries 08/10/2017    Low testosterone in male 02/06/2018    Chronic musculoskeletal pain  "01/28/2021    Depression 01/28/2021     Resolved Ambulatory Problems     Diagnosis Date Noted    No Resolved Ambulatory Problems     No Additional Past Medical History     Allergies:  Review of patient's allergies indicates:  No Known Allergies    Pertinent Family History:  Family History   Problem Relation Age of Onset    Cancer Mother     Heart disease Mother     Diabetes Brother        Pertinent Social History:  Social History     Tobacco Use    Smoking status: Never    Smokeless tobacco: Never   Substance Use Topics    Alcohol use: Yes     Comment: social    Drug use: No       Medications:  Current Outpatient Medications   Medication Instructions    ascorbic acid (vitamin C) (VITAMIN C) 1,000 mg, Oral, Daily    atenoloL (TENORMIN) 50 mg, Oral    calcium carbonate (OS-MELISSA) 2,000 mg, Oral, Once    cyanocobalamin (VITAMIN B-12) 5,000 mcg, Oral, Daily    DULoxetine (CYMBALTA) 30 MG capsule TAKE ONE CAPSULE BY MOUTH ONCE DAILY    fish oil-omega-3 fatty acids 300-1,000 mg capsule Oral, Daily    multivit-min/FA/lycopen/lutein (CENTRUM SILVER MEN ORAL) Oral    potassium chloride SA (K-DUR,KLOR-CON) 20 MEQ tablet 99 mEq, Oral, 2 times daily    simvastatin (ZOCOR) 80 MG tablet TAKE ONE TABLET BY MOUTH EVERY EVENING    syringe with needle (BD INTEGRA SYRINGE) 3 mL 21 gauge x 1 1/2" Syrg Inject as directed    testosterone cypionate (DEPOTESTOTERONE CYPIONATE) 200 mg/mL injection INJECT 1 ML INTRAMUSCULARLY EVERY 14 DAYS    tiZANidine (ZANAFLEX) 4 MG tablet TAKE ONE TABLET BY MOUTH EVERY NIGHT AT BEDTIME AS NEEDED      Objective:     Vitals:    03/18/24 1057   BP: (!) 150/97   Pulse: 82      General:  Well-appearing, well-nourished, NAD, cooperative  MSK: SLR- bilaterally. No TTP on iliac or hip bones.     Neurologic Exam:   Awake, alert and oriented x3  Speech spontaneous and fluent, intact comprehension.   Adequate fund of knowledge, vocabulary.    CN II - CN XII:  PERRLA. EOM intact. No Nystagmus. No ophthalmoplegia. "   Facial sensation is normal to light touch.   Facial expression is full and symmetric.   Hearing is intact bilaterally.   Palate elevates symmetrically.   SCM and Trapezius full strength bilaterally.   Tongue is midline.     Motor:  Normal tone in all four limbs. Decreased muscle mass throughout the RLE.  There are no atrophy or fasciculations. No tremor.     Shoulder  Abd Shoulder Add Elbow   Flex Elbow  Ext Wrist   Flex Wrist  Ext Finger  Flex Finger  Ext Finger  Abd Finger   Add IO Opposition   Right 5 5 5 5       5    Left 5 5 5 5       5       Hip  Flex Hip  Ext Thigh   Abd Thigh  Add Knee  Flex Knee  Ext Plantar  Flex Dorsiflex   Right 5 5   5 5 5 4+   Left 5 5   5 5 5 5   Eversion and inversion 4+/5 on RLE    Sensory:  Light touch: reduced tactile sense on RLE  Pinprick: reduced on RLE  Vibration: vibratory sense reduced on RLE and on LLE up to mid shins  Proprioception: proprioceptive sense present on RUE and on LUE  Romberg is negative.    DTRs:   Biceps Brachioradialis Triceps Marily Patellar Ankle Plantar   Right 2+ 2+  - 2+ 2+ down   Left 2+ 2+  - 2+ 2+ down     Coordination:  Finger to nose is normal bilaterally.  Normal fine finger movements and rapid alternating movements.    Gait:  Slightly antalgic casual gait.  Normal tandem gait.      Pertinent lab results  Lab Results   Component Value Date    SEDRATE 4 08/10/2017     Lab Results   Component Value Date    [id number] Non-reactive 03/08/2024     Lab Results   Component Value Date    TSH 2.750 03/08/2024    FREET4 0.68 (L) 03/09/2023    WBC 8.68 03/08/2024    LYMPH 2.2 03/08/2024    LYMPH 25.2 03/08/2024    RBC 4.82 03/08/2024    HGB 14.8 03/08/2024    HCT 45.8 03/08/2024    MCV 95 03/08/2024     03/08/2024     03/08/2024    K 3.6 03/08/2024    CO2 27 03/08/2024    BUN 8 03/08/2024    CREATININE 1.0 03/08/2024    CALCIUM 10.0 03/08/2024    AST 26 03/08/2024    ALT 24 03/08/2024     Pertinent imaging results  *No relevant imaging  available to review     Other pertinent studies  None    Assessment:   Familia Mccarthy is a 58 y.o. right-handed male with HTN, hyperlipidemia, and prediabetes who presents for evaluation of progressive numbness in BLE (R>>L), weakness and muscle wasting in RLE. Description of walking difficulty and relief with taking a break and bending over is concerning for lumbar stenosis with possible radiculopathy. We will obtain imaging and refer to spine specialist if this is confirmed.     1. Spinal stenosis, lumbosacral region    2. Weakness of lower extremity, unspecified laterality    3. Atrophy of muscle of right thigh       Plan:     - MRI Lumbar Spine W WO Cont; Future  - LORazepam (ATIVAN) 1 MG tablet; Please take one tablet 30 minutes prior to MRI, you may take a second dose immediately before the MRI if needed. Do not drive after taking this medication.  Dispense: 2 tablet; Refill: 0  - Will consider EMG-NCS if MRI is unrevealing   - Follow up: TBD    Plan was discussed in detail with the patient, who is in agreement.    Time spent on this encounter: 42 minutes. This includes face to face time (obtaining history, documenting clinical information in the EMR, physical exam, discussing the plan with patient) and non-face to face time (such as preparing to see the patient (ie. Chart review, reviewing and interpreting previous labs and imaging), further EMR documentation, ordering tests, independently interpreting results and communicating results to the patient/family/caregiver, or care coordinator).     Disclaimer: This note was partly generated using dictation software which may occasionally result in transcription errors that are missed on review.        Mary Crane MD    [facility]  03/18/2024

## 2024-03-28 ENCOUNTER — HOSPITAL ENCOUNTER (OUTPATIENT)
Dept: RADIOLOGY | Facility: HOSPITAL | Age: 59
Discharge: HOME | End: 2024-03-28
Attending: STUDENT IN AN ORGANIZED HEALTH CARE EDUCATION/TRAINING PROGRAM
Payer: COMMERCIAL

## 2024-03-28 DIAGNOSIS — M62.551 ATROPHY OF MUSCLE OF RIGHT THIGH: ICD-10-CM

## 2024-03-28 DIAGNOSIS — M48.07 SPINAL STENOSIS, LUMBOSACRAL REGION: ICD-10-CM

## 2024-03-28 DIAGNOSIS — R29.898 WEAKNESS OF LOWER EXTREMITY, UNSPECIFIED LATERALITY: ICD-10-CM

## 2024-03-28 PROCEDURE — 72158 MRI LUMBAR SPINE W/O & W/DYE: CPT | Mod: 26,,, | Performed by: RADIOLOGY

## 2024-03-28 PROCEDURE — 72158 MRI LUMBAR SPINE W/O & W/DYE: CPT | Mod: TC

## 2024-03-28 PROCEDURE — 25500020 PHARM REV CODE 255: Performed by: STUDENT IN AN ORGANIZED HEALTH CARE EDUCATION/TRAINING PROGRAM

## 2024-03-28 PROCEDURE — A9585 GADOBUTROL INJECTION: HCPCS | Performed by: STUDENT IN AN ORGANIZED HEALTH CARE EDUCATION/TRAINING PROGRAM

## 2024-03-28 RX ORDER — GADOBUTROL 604.72 MG/ML
7.5 INJECTION INTRAVENOUS
Status: COMPLETED | OUTPATIENT
Start: 2024-03-28 | End: 2024-03-28

## 2024-03-28 RX ADMIN — GADOBUTROL 7.5 ML: 604.72 INJECTION INTRAVENOUS at 02:03

## 2024-04-04 ENCOUNTER — PATIENT MESSAGE (OUTPATIENT)
Dept: NEUROLOGY | Facility: CLINIC | Age: 59
End: 2024-04-04
Payer: COMMERCIAL

## 2024-04-04 DIAGNOSIS — M48.07 SPINAL STENOSIS, LUMBOSACRAL REGION: Primary | ICD-10-CM

## 2024-04-04 DIAGNOSIS — M62.551 ATROPHY OF MUSCLE OF RIGHT THIGH: ICD-10-CM

## 2024-04-04 DIAGNOSIS — R29.898 WEAKNESS OF LOWER EXTREMITY, UNSPECIFIED LATERALITY: ICD-10-CM

## 2024-04-04 NOTE — TELEPHONE ENCOUNTER
Discussed the MRI results over the phone with Mr. Mccarthy.  MRI of lumbar spine revealed multilevel spondylosis and mod/severe canal and foraminal stenosis which is the culprit to weakness, numbness, muscle wasting in lower extremities and gait disturbance. Will refer to spine surgery for evaluation and management. Mr. Mccarthy agrees with the plan.        Mary Crane MD

## 2024-04-04 NOTE — PROGRESS NOTES
DATE: 4/9/2024  PATIENT: Familia Mccarthy    Supervising Physician: Erick Gustafson M.D.    CHIEF COMPLAINT: low back and bilateral leg pain    HISTORY:  Familia Mccarthy is a 58 y.o. male here for initial evaluation of low back and bilateral leg pain (Back - 10, Leg - 10).  The pain in the lower back and both legs is what bothers him most.  The pain has been present for years, worsening over time. The patient describes the pain as aching and cramping.  The pain is worse with nothing and improved by nothing and is constant. There is positive associated numbness and tingling. There is positive subjective weakness. Prior treatments have included no PT, ESIs, surgery.    The patient denies myelopathic symptoms such as handwriting changes or difficulty with buttons/coins/keys. Denies perineal paresthesias, ENDORSES urinary retention    PAST MEDICAL/SURGICAL HISTORY:  Past Medical History:   Diagnosis Date    Hyperlipidemia     Hypertension      Past Surgical History:   Procedure Laterality Date    KNEE SURGERY      bilateral       Medications:   Current Outpatient Medications on File Prior to Visit   Medication Sig Dispense Refill    ascorbic acid, vitamin C, (VITAMIN C) 1000 MG tablet Take 1,000 mg by mouth once daily.      atenoloL (TENORMIN) 50 MG tablet TAKE 1 TABLET BY MOUTH EVERY DAY 90 tablet 2    calcium carbonate (OS-MELISSA) 600 mg calcium (1,500 mg) Tab Take 2,000 mg by mouth once.      cyanocobalamin (VITAMIN B-12) 1000 MCG tablet Take 5,000 mcg by mouth once daily.      DULoxetine (CYMBALTA) 30 MG capsule TAKE ONE CAPSULE BY MOUTH ONCE DAILY 90 capsule 3    fish oil-omega-3 fatty acids 300-1,000 mg capsule Take by mouth once daily.      LORazepam (ATIVAN) 1 MG tablet Please take one tablet 30 minutes prior to MRI, you may take a second dose immediately before the MRI if needed. Do not drive after taking this medication. 2 tablet 0    multivit-min/FA/lycopen/lutein (CENTRUM SILVER MEN ORAL) Take by mouth.    "   potassium chloride SA (K-DUR,KLOR-CON) 20 MEQ tablet Take 99 mEq by mouth 2 (two) times daily.      simvastatin (ZOCOR) 80 MG tablet TAKE ONE TABLET BY MOUTH EVERY EVENING 90 tablet 1    syringe with needle (BD INTEGRA SYRINGE) 3 mL 21 gauge x 1 1/2" Syrg Inject as directed 100 Syringe 0    testosterone cypionate (DEPOTESTOTERONE CYPIONATE) 200 mg/mL injection INJECT 1 ML INTRAMUSCULARLY EVERY 14 DAYS 10 mL 2    tiZANidine (ZANAFLEX) 4 MG tablet TAKE ONE TABLET BY MOUTH EVERY NIGHT AT BEDTIME AS NEEDED 30 tablet 2     No current facility-administered medications on file prior to visit.       Social History:   Social History     Socioeconomic History    Marital status:    Tobacco Use    Smoking status: Never    Smokeless tobacco: Never   Substance and Sexual Activity    Alcohol use: Yes     Comment: social    Drug use: No    Sexual activity: Yes     Partners: Female     Social Determinants of Health     Financial Resource Strain: Medium Risk (4/5/2024)    Overall Financial Resource Strain (CARDIA)     Difficulty of Paying Living Expenses: Somewhat hard   Food Insecurity: No Food Insecurity (4/5/2024)    Hunger Vital Sign     Worried About Running Out of Food in the Last Year: Never true     Ran Out of Food in the Last Year: Never true   Transportation Needs: No Transportation Needs (4/5/2024)    PRAPARE - Transportation     Lack of Transportation (Medical): No     Lack of Transportation (Non-Medical): No   Physical Activity: Unknown (4/5/2024)    Exercise Vital Sign     Days of Exercise per Week: Patient declined   Stress: No Stress Concern Present (4/5/2024)    [employer] [place] [employer] - Occupational Stress Questionnaire     Feeling of Stress : Not at all   Social Connections: Unknown (4/5/2024)    Social Connection and Isolation Panel [NHANES]     Frequency of Communication with Friends and Family: More than three times a week     Frequency of Social Gatherings with Friends and Family: " Three times a week     Active Member of Clubs or Organizations: No     Attends Club or Organization Meetings: Never     Marital Status:    Housing Stability: Unknown (4/5/2024)    Housing Stability Vital Sign     Unable to Pay for Housing in the Last Year: No     Unstable Housing in the Last Year: No       REVIEW OF SYSTEMS:  Constitution: Negative. Negative for chills, fever and night sweats.   Cardiovascular: Negative for chest pain and syncope.   Respiratory: Negative for cough and shortness of breath.   Gastrointestinal: See HPI. Negative for nausea/vomiting. Negative for abdominal pain.  Genitourinary: See HPI. Negative for discoloration or dysuria.  Skin: Negative for dry skin, itching and rash.   Hematologic/Lymphatic: Negative for bleeding problem. Does not bruise/bleed easily.   Musculoskeletal: Negative for falls and muscle weakness.   Neurological: See HPI. No seizures.   Endocrine: Negative for polydipsia, polyphagia and polyuria.   Allergic/Immunologic: Negative for hives and persistent infections.     EXAM:  There were no vitals taken for this visit.    General: The patient is a very pleasant 58 y.o. male in no apparent distress, the patient is oriented to person, place and time.  Psych: Normal mood and affect  HEENT: Vision grossly intact, hearing intact to the spoken word.  Lungs: Respirations unlabored.  Gait: bow legged station.  Skin: Dorsal lumbar skin negative for rashes, lesions, hairy patches and surgical scars. There is negative lumbar tenderness to palpation.  Range of motion: Lumbar range of motion is acceptable.  Spinal Balance: Global saggital and coronal spinal balance acceptable, not significant for scoliosis and kyphosis.  Musculoskeletal: No pain with the range of motion of the bilateral hips. No trochanteric tenderness to palpation.  Vascular: Bilateral lower extremities warm and well perfused, dorsalis pedis pulses 2+ bilaterally.  Neurological: Normal strength and tone in  all major motor groups in the bilateral lower extremities. Diminished sensation to light touch in the L2-S1 dermatomes bilaterally.  Deep tendon reflexes symmetric 2+ in the bilateral lower extremities.  Negative Babinski bilaterally. Straight leg raise negative bilaterally.    IMAGING:      Today I personally reviewed AP, Lat and Flex/Ex  upright L-spine films that demonstrate moderate degenerative changes with trace anterolisthesis of L4 on L5.      MRI lumbar demonstrates lumbar spondylosis, contributing to moderate/ severe spinal canal stenosis L1-2 through L4-5 and moderate/ severe neural foraminal narrowing at L3-4 and L4-5.       There is no height or weight on file to calculate BMI.    Hemoglobin A1C   Date Value Ref Range Status   03/08/2024 5.6 4.0 - 5.6 % Final     Comment:     ADA Screening Guidelines:  5.7-6.4%  Consistent with prediabetes  >or=6.5%  Consistent with diabetes    High levels of fetal hemoglobin interfere with the HbA1C  assay. Heterozygous hemoglobin variants (HbS, HgC, etc)do  not significantly interfere with this assay.   However, presence of multiple variants may affect accuracy.     03/09/2023 5.7 (H) 4.0 - 5.6 % Final     Comment:     ADA Screening Guidelines:  5.7-6.4%  Consistent with prediabetes  >or=6.5%  Consistent with diabetes    High levels of fetal hemoglobin interfere with the HbA1C  assay. Heterozygous hemoglobin variants (HbS, HgC, etc)do  not significantly interfere with this assay.   However, presence of multiple variants may affect accuracy.     03/11/2019 6.2 (H) 4.0 - 5.6 % Final     Comment:     ADA Screening Guidelines:  5.7-6.4%  Consistent with prediabetes  >or=6.5%  Consistent with diabetes  High levels of fetal hemoglobin interfere with the HbA1C  assay. Heterozygous hemoglobin variants (HbS, HgC, etc)do  not significantly interfere with this assay.   However, presence of multiple variants may affect accuracy.             ASSESSMENT/PLAN:    There are no  diagnoses linked to this encounter.    Today we discussed at length all of the different treatment options including anti-inflammatories, acetaminophen, rest, ice, heat, physical therapy including strengthening and stretching exercises, home exercises, ROM, aerobic conditioning, aqua therapy, other modalities including ultrasound, massage, and dry needling, epidural steroid injections and finally surgical intervention.      Pt presents with severe spinal stenosis with urinary retention. Will send medrol dose pack and gabapentin to pharmacy. Will have pt fu with Dr. Gustafson for surgical consult

## 2024-04-05 DIAGNOSIS — M51.36 DDD (DEGENERATIVE DISC DISEASE), LUMBAR: Primary | ICD-10-CM

## 2024-04-05 DIAGNOSIS — M79.10 MYALGIA: ICD-10-CM

## 2024-04-05 DIAGNOSIS — I10 ESSENTIAL HYPERTENSION: ICD-10-CM

## 2024-04-05 NOTE — TELEPHONE ENCOUNTER
No care due was identified.  [facility] Embedded Care Due Messages. Reference number: [id number].   4/05/2024 2:00:36 PM CDT

## 2024-04-06 RX ORDER — ATENOLOL 50 MG/1
50 TABLET ORAL
Qty: 90 TABLET | Refills: 2 | Status: SHIPPED | OUTPATIENT
Start: 2024-04-06

## 2024-04-06 RX ORDER — TIZANIDINE 4 MG/1
TABLET ORAL
Qty: 30 TABLET | Refills: 2 | Status: SHIPPED | OUTPATIENT
Start: 2024-04-06 | End: 2024-04-18

## 2024-04-06 NOTE — TELEPHONE ENCOUNTER
Refill Routing Note   Medication(s) are not appropriate for processing by [facility] for the following reason(s):        Required vitals abnormal  Outside of protocol    [facility] action(s):  Defer  Route        Medication Therapy Plan: BP 3/18/24 (!) 150/97      Appointments  past 12m or future 3m with PCP    Date Provider   Last Visit   3/8/2024 Donaldo Duke MD   Next Visit   Visit date not found Donaldo Duke MD   ED visits in past 90 days: 0        Note composed:2:13 PM 04/06/2024

## 2024-04-11 ENCOUNTER — OFFICE VISIT (OUTPATIENT)
Dept: ORTHOPEDICS | Facility: CLINIC | Age: 59
End: 2024-04-11
Payer: COMMERCIAL

## 2024-04-11 ENCOUNTER — HOSPITAL ENCOUNTER (OUTPATIENT)
Dept: RADIOLOGY | Facility: HOSPITAL | Age: 59
Discharge: HOME | End: 2024-04-11
Attending: ORTHOPAEDIC SURGERY
Payer: COMMERCIAL

## 2024-04-11 VITALS — BODY MASS INDEX: 26.73 KG/M2 | WEIGHT: 176.38 LBS | HEIGHT: 68 IN

## 2024-04-11 DIAGNOSIS — M48.07 SPINAL STENOSIS, LUMBOSACRAL REGION: ICD-10-CM

## 2024-04-11 DIAGNOSIS — M62.551 ATROPHY OF MUSCLE OF RIGHT THIGH: ICD-10-CM

## 2024-04-11 DIAGNOSIS — R29.898 WEAKNESS OF LOWER EXTREMITY, UNSPECIFIED LATERALITY: ICD-10-CM

## 2024-04-11 DIAGNOSIS — M51.36 DDD (DEGENERATIVE DISC DISEASE), LUMBAR: ICD-10-CM

## 2024-04-11 PROCEDURE — 99204 OFFICE O/P NEW MOD 45 MIN: CPT | Mod: S$GLB,,, | Performed by: ORTHOPAEDIC SURGERY

## 2024-04-11 PROCEDURE — 99999 PR PBB SHADOW E&M-EST. PATIENT-LVL III: CPT | Mod: PBBFAC,,, | Performed by: ORTHOPAEDIC SURGERY

## 2024-04-11 PROCEDURE — 72110 X-RAY EXAM L-2 SPINE 4/>VWS: CPT | Mod: 26,,, | Performed by: RADIOLOGY

## 2024-04-11 PROCEDURE — 72110 X-RAY EXAM L-2 SPINE 4/>VWS: CPT | Mod: TC

## 2024-04-11 RX ORDER — METHYLPREDNISOLONE 4 MG/1
TABLET ORAL
Qty: 1 EACH | Refills: 0 | Status: SHIPPED | OUTPATIENT
Start: 2024-04-11 | End: 2024-05-02

## 2024-04-11 RX ORDER — GABAPENTIN 300 MG/1
300 CAPSULE ORAL 3 TIMES DAILY
Qty: 90 CAPSULE | Refills: 2 | Status: ON HOLD | OUTPATIENT
Start: 2024-04-11 | End: 2024-05-28

## 2024-04-18 ENCOUNTER — OFFICE VISIT (OUTPATIENT)
Dept: ORTHOPEDICS | Facility: CLINIC | Age: 59
End: 2024-04-18
Payer: COMMERCIAL

## 2024-04-18 VITALS — BODY MASS INDEX: 26.37 KG/M2 | HEIGHT: 68 IN | WEIGHT: 174 LBS

## 2024-04-18 DIAGNOSIS — M51.36 DDD (DEGENERATIVE DISC DISEASE), LUMBAR: ICD-10-CM

## 2024-04-18 DIAGNOSIS — M47.816 LUMBAR SPONDYLOSIS: Primary | ICD-10-CM

## 2024-04-18 DIAGNOSIS — M43.10 DEGENERATIVE SPONDYLOLISTHESIS: ICD-10-CM

## 2024-04-18 DIAGNOSIS — M48.062 LUMBAR STENOSIS WITH NEUROGENIC CLAUDICATION: ICD-10-CM

## 2024-04-18 PROCEDURE — 99214 OFFICE O/P EST MOD 30 MIN: CPT | Mod: S$GLB,,, | Performed by: ORTHOPAEDIC SURGERY

## 2024-04-18 PROCEDURE — 99999 PR PBB SHADOW E&M-EST. PATIENT-LVL IV: CPT | Mod: PBBFAC,,, | Performed by: ORTHOPAEDIC SURGERY

## 2024-04-18 RX ORDER — MELOXICAM 15 MG/1
15 TABLET ORAL DAILY
Qty: 60 TABLET | Refills: 1 | Status: SHIPPED | OUTPATIENT
Start: 2024-04-18

## 2024-04-18 RX ORDER — METHOCARBAMOL 500 MG/1
500 TABLET, FILM COATED ORAL 3 TIMES DAILY
Qty: 60 TABLET | Refills: 1 | Status: SHIPPED | OUTPATIENT
Start: 2024-04-18 | End: 2024-05-29

## 2024-04-18 NOTE — PROGRESS NOTES
DATE: 4/19/2024  PATIENT: Familia Mccarthy    Attending Physician: Erick Gustafson M.D.    CHIEF COMPLAINT: LBP and BLE pain    HISTORY:  Familia Mccarthy is a 58 y.o. male presents for initial evaluation of low back and b/l leg pain (Back - 10, Leg - 10). The pain has been present for years. The patient describes the pain as dull and it radiates posterolaterally down BLE to the feet. He cannot walk longer than 1 block due to pain; he gets relief by leaning forward on a shopping cart. The pain is worse with activity and improved by rest. There is BLE associated numbness and tingling. There is RLE subjective weakness. Prior treatments have included OTC meds, but no PT, MARI or surgery.    The Patient denies myelopathic symptoms such as handwriting changes or difficulty with buttons/coins/keys. Denies perineal paresthesias, bowel/bladder dysfunction.    The patient does not smoke (smokes marijuana), have DM or endorse IVDU. The patient is not on any blood thinners and does not take chronic narcotics. He is a retired .    PAST MEDICAL/SURGICAL HISTORY:  Past Medical History:   Diagnosis Date    Hyperlipidemia     Hypertension      Past Surgical History:   Procedure Laterality Date    KNEE SURGERY      bilateral       Current Medications:   Current Outpatient Medications:     ascorbic acid, vitamin C, (VITAMIN C) 1000 MG tablet, Take 1,000 mg by mouth once daily., Disp: , Rfl:     atenoloL (TENORMIN) 50 MG tablet, TAKE 1 TABLET BY MOUTH EVERY DAY, Disp: 90 tablet, Rfl: 2    calcium carbonate (OS-MELISSA) 600 mg calcium (1,500 mg) Tab, Take 2,000 mg by mouth once., Disp: , Rfl:     cyanocobalamin (VITAMIN B-12) 1000 MCG tablet, Take 5,000 mcg by mouth once daily., Disp: , Rfl:     DULoxetine (CYMBALTA) 30 MG capsule, TAKE ONE CAPSULE BY MOUTH ONCE DAILY, Disp: 90 capsule, Rfl: 3    fish oil-omega-3 fatty acids 300-1,000 mg capsule, Take by mouth once daily., Disp: , Rfl:     gabapentin (NEURONTIN) 300 MG  "capsule, Take 1 capsule (300 mg total) by mouth 3 (three) times daily., Disp: 90 capsule, Rfl: 2    LORazepam (ATIVAN) 1 MG tablet, Please take one tablet 30 minutes prior to MRI, you may take a second dose immediately before the MRI if needed. Do not drive after taking this medication., Disp: 2 tablet, Rfl: 0    methylPREDNISolone (MEDROL DOSEPACK) 4 mg tablet, use as directed, Disp: 1 each, Rfl: 0    multivit-min/FA/lycopen/lutein (CENTRUM SILVER MEN ORAL), Take by mouth., Disp: , Rfl:     potassium chloride SA (K-DUR,KLOR-CON) 20 MEQ tablet, Take 99 mEq by mouth 2 (two) times daily., Disp: , Rfl:     simvastatin (ZOCOR) 80 MG tablet, TAKE ONE TABLET BY MOUTH EVERY EVENING, Disp: 90 tablet, Rfl: 1    syringe with needle (BD INTEGRA SYRINGE) 3 mL 21 gauge x 1 1/2" Syrg, Inject as directed, Disp: 100 Syringe, Rfl: 0    testosterone cypionate (DEPOTESTOTERONE CYPIONATE) 200 mg/mL injection, INJECT 1 ML INTRAMUSCULARLY EVERY 14 DAYS, Disp: 10 mL, Rfl: 2    meloxicam (MOBIC) 15 MG tablet, Take 1 tablet (15 mg total) by mouth once daily., Disp: 60 tablet, Rfl: 1    methocarbamoL (ROBAXIN) 500 MG Tab, Take 1 tablet (500 mg total) by mouth 3 (three) times daily., Disp: 60 tablet, Rfl: 1    Social History:   Social History     Socioeconomic History    Marital status:    Tobacco Use    Smoking status: Never    Smokeless tobacco: Never   Substance and Sexual Activity    Alcohol use: Yes     Comment: social    Drug use: No    Sexual activity: Yes     Partners: Female     Social Determinants of Health     Financial Resource Strain: Medium Risk (4/5/2024)    Overall Financial Resource Strain (CARDIA)     Difficulty of Paying Living Expenses: Somewhat hard   Food Insecurity: No Food Insecurity (4/5/2024)    Hunger Vital Sign     Worried About Running Out of Food in the Last Year: Never true     Ran Out of Food in the Last Year: Never true   Transportation Needs: No Transportation Needs (4/5/2024)    PRAPARE - " "Transportation     Lack of Transportation (Medical): No     Lack of Transportation (Non-Medical): No   Physical Activity: Unknown (4/5/2024)    Exercise Vital Sign     Days of Exercise per Week: Patient declined   Stress: No Stress Concern Present (4/5/2024)    [employer] [place] [employer] - Occupational Stress Questionnaire     Feeling of Stress : Not at all   Social Connections: Unknown (4/5/2024)    Social Connection and Isolation Panel [NHANES]     Frequency of Communication with Friends and Family: More than three times a week     Frequency of Social Gatherings with Friends and Family: Three times a week     Active Member of Clubs or Organizations: No     Attends Club or Organization Meetings: Never     Marital Status:    Housing Stability: Unknown (4/5/2024)    Housing Stability Vital Sign     Unable to Pay for Housing in the Last Year: No     Unstable Housing in the Last Year: No       REVIEW OF SYSTEMS:  Constitution: Negative. Negative for chills, fever and night sweats.   Cardiovascular: Negative for chest pain and syncope.   Respiratory: Negative for cough and shortness of breath.   Gastrointestinal: See HPI. Negative for nausea/vomiting. Negative for abdominal pain.  Genitourinary: See HPI. Negative for discoloration or dysuria.  Hematologic/Lymphatic: negative for bleeding/clotting disorders.   Musculoskeletal: Negative for falls and muscle weakness.   Neurological: See HPI. no history of seizures. no history of cranial surgery or shunts.  Neurological: See HPI. No seizures.   Endocrine: Negative for polydipsia, polyphagia and polyuria.   Allergic/Immunologic: Negative for hives and persistent infections.     EXAM:  Ht 5' 8" (1.727 m)   Wt 78.9 kg (174 lb)   BMI 26.46 kg/m²     PHYSICAL EXAMINATION:    General: The patient is a 58 y.o. male in no apparent distress, the patient is orientatied to person, place and time.  Psych: Normal mood and affect  HEENT: Vision grossly intact, " hearing intact to the spoken word.  Lungs: Respirations unlabored.  Gait: Normal station and gait, no difficulty with toe or heel walk.   Skin: Dorsal lumbar skin negative for rashes, lesions, hairy patches and surgical scars. There is lower lumbar tenderness to palpation.  Range of motion: Lumbar range of motion is acceptable.  Spinal Balance: Global saggital and coronal spinal balance acceptable, no significant for scoliosis and kyphosis.  Musculoskeletal: No pain with the range of motion of the bilateral hips. No trochanteric tenderness to palpation.  Vascular: Bilateral lower extremities warm and well perfused, Dorsalis pedis pulses 2+ bilaterally.  Neurological: Normal strength and tone in all major motor groups in the bilateral lower extremities. Normal sensation to light touch in the L2-S1 dermatomes bilaterally.  Deep tendon reflexes symmetric 2+ in the bilateral lower extremities.  Negative Babinski bilaterally. Straight leg raise negative bilaterally.    IMAGING:   Today I independently reviewed the following images and my interpretations are as follows:    AP, Lat and Flex/Ex  upright L-spine demonstrate spondylosis and DDD. L4-5 anterolisthesis measuring 6mm.    Lumbar MRI showed severe L3-5 central and b/l foraminal stenosis.      Body mass index is 26.46 kg/m².  Hemoglobin A1C   Date Value Ref Range Status   03/08/2024 5.6 4.0 - 5.6 % Final     Comment:     ADA Screening Guidelines:  5.7-6.4%  Consistent with prediabetes  >or=6.5%  Consistent with diabetes    High levels of fetal hemoglobin interfere with the HbA1C  assay. Heterozygous hemoglobin variants (HbS, HgC, etc)do  not significantly interfere with this assay.   However, presence of multiple variants may affect accuracy.     03/09/2023 5.7 (H) 4.0 - 5.6 % Final     Comment:     ADA Screening Guidelines:  5.7-6.4%  Consistent with prediabetes  >or=6.5%  Consistent with diabetes    High levels of fetal hemoglobin interfere with the HbA1C  assay.  Heterozygous hemoglobin variants (HbS, HgC, etc)do  not significantly interfere with this assay.   However, presence of multiple variants may affect accuracy.     03/11/2019 6.2 (H) 4.0 - 5.6 % Final     Comment:     ADA Screening Guidelines:  5.7-6.4%  Consistent with prediabetes  >or=6.5%  Consistent with diabetes  High levels of fetal hemoglobin interfere with the HbA1C  assay. Heterozygous hemoglobin variants (HbS, HgC, etc)do  not significantly interfere with this assay.   However, presence of multiple variants may affect accuracy.         ASSESSMENT/PLAN:    Familia was seen today for low-back pain.    Diagnoses and all orders for this visit:    Lumbar spondylosis    Lumbar stenosis with neurogenic claudication  -     meloxicam (MOBIC) 15 MG tablet; Take 1 tablet (15 mg total) by mouth once daily.  -     methocarbamoL (ROBAXIN) 500 MG Tab; Take 1 tablet (500 mg total) by mouth 3 (three) times daily.  -     Ambulatory referral/consult to Physical/Occupational Therapy; Future  -     Ambulatory referral/consult to Pain Clinic; Future    DDD (degenerative disc disease), lumbar    Degenerative spondylolisthesis      Follow up in about 3 months (around 7/18/2024).    Patient has lumbar degenerative spondylolisthesis, stenosis and neurogenic claudication. I discussed the natural history of their diagnoses as well as surgical and nonsurgical treatment options. I educated the patient on the importance of core/back strengthening, correct posture, bending/lifting ergonomics, and low-impact aerobic exercises (walking, elliptical, and aquatherapy). I prescribed mobic, robaxin. Continue medications. I will refer the patient to PT for core/back strengthening and to Pain Mgmt for MARI. Patient will follow up in 3 months for re-evaluation.    I have personally examined the patient and agree with the above plan.    Erick Gustafson MD  Orthopaedic Spine Surgeon  Department of Orthopaedic Surgery  [phone]

## 2024-04-30 DIAGNOSIS — E29.1 MALE HYPOGONADISM: ICD-10-CM

## 2024-04-30 RX ORDER — TESTOSTERONE CYPIONATE 200 MG/ML
INJECTION, SOLUTION INTRAMUSCULAR
Qty: 10 ML | Refills: 2 | Status: SHIPPED | OUTPATIENT
Start: 2024-04-30

## 2024-05-10 ENCOUNTER — OFFICE VISIT (OUTPATIENT)
Dept: PAIN MEDICINE | Facility: CLINIC | Age: 59
End: 2024-05-10
Payer: COMMERCIAL

## 2024-05-10 ENCOUNTER — TELEPHONE (OUTPATIENT)
Dept: PAIN MEDICINE | Facility: CLINIC | Age: 59
End: 2024-05-10

## 2024-05-10 VITALS
BODY MASS INDEX: 27.3 KG/M2 | HEIGHT: 68 IN | HEART RATE: 68 BPM | SYSTOLIC BLOOD PRESSURE: 143 MMHG | WEIGHT: 180.13 LBS | DIASTOLIC BLOOD PRESSURE: 93 MMHG

## 2024-05-10 DIAGNOSIS — M54.16 LUMBAR RADICULOPATHY: Primary | ICD-10-CM

## 2024-05-10 DIAGNOSIS — M54.16 LUMBAR RADICULOPATHY: ICD-10-CM

## 2024-05-10 DIAGNOSIS — M48.062 LUMBAR STENOSIS WITH NEUROGENIC CLAUDICATION: Primary | ICD-10-CM

## 2024-05-10 PROCEDURE — 99204 OFFICE O/P NEW MOD 45 MIN: CPT | Mod: S$GLB,,, | Performed by: STUDENT IN AN ORGANIZED HEALTH CARE EDUCATION/TRAINING PROGRAM

## 2024-05-10 PROCEDURE — 99999 PR PBB SHADOW E&M-EST. PATIENT-LVL III: CPT | Mod: PBBFAC,,, | Performed by: STUDENT IN AN ORGANIZED HEALTH CARE EDUCATION/TRAINING PROGRAM

## 2024-05-10 NOTE — PROGRESS NOTES
Chronic Pain - New Consult    Referring Physician: Erick Gustafson MD    Date: 05/10/2024     Re: Familia Mccarthy  MR#: [id number]  YOB: 1965  Age: 58 y.o.    Chief Complaint:   Chief Complaint   Patient presents with    Back Pain     Lumbar back pain, 10/10 pain level today.      **This note is dictated using the M*Modal Fluency Direct word recognition program. There are word recognition mistakes that are occasionally missed on review.**    ASSESSMENT: 58 y.o. year old male with Lumbar back pain 10/10, consistent with     1. Lumbar stenosis with neurogenic claudication  Ambulatory referral/consult to Pain Clinic    Procedure Order to Pain Management      2. Lumbar radiculopathy          PLAN:     Lumbar spinal stenosis with neurogenic claudication and radiculopathy  -increase gabapentin to 600mg TID. He can send me a message when he needs a refill.  -patient has very classes spinal stensois symptoms.  Discussed that he will most likely need surgery.  My concern is the muscle atrophy and weakness that he is having  -We will schedule the patient for L5-S1 MARI (low volume).  Risks,benefits, and alternatives of the procedure were discussed with the patient and He would like to proceed with the procedure.  At this juncture, we believe that the patient's medical comorbidity status adds medium risk and complexity to our proposed evaluation and treatment.  -if L5-S1 MARI not helpful, then will try b/l L4-5 TFESI    - RTC after MARI  - Counseled patient regarding the importance of weight loss and activity modification and physical therapy.    The above plan and management options were discussed at length with patient. Patient is in agreement with the above and verbalized understanding. It will be communicated with the referring physician via electronic record, fax, or mail.  Lab/study reports reviewed were important and necessary because subsequent medical and treatment recommendations required review of the  "above lab/study reports. Images viewed/reviewed above were important and necessary because subsequent medical and treatment recommendations required review of the reviewed image(s).     Electronically signed by:  Jorge Alberto Wilson DO  05/10/2024    =========================================================================================================    SUBJECTIVE:    Familia Mccarthy is a 58 y.o. male presents to the clinic for the evaluation of lumbar back pain. The pain started 7/8 years ago following no inciting event and symptoms have been worsening. The patient states that he has a history of knee surgeries when he was a kid and is knock kneed as a result.      He is getting muscle atrophy of the right thigh > left thigh.    Pain Description:    The pain is located in the lower back pain area and radiates to both legs (knees and calves).    At BEST  10/10   At WORST  10/10 on the WORST day.    On average pain is rated as 9/10.   Today the pain is rated as 10/10  The pain is continuous.  The pain is described as burning, numbing, and "feels like I'm carrying a thousand pounds"     Symptoms interfere with daily activity.   Exacerbating factors: Standing and Walking.    Mitigating factors medications.   He reports 8 hours of sleep per night.    Physical Therapy/Home Exercise: Yes, currently has a home exercise program    Current Pain Medications:    - gabapentin, methocarbamol, meloxicam    Failed Pain Medications:    - tylenol    Pain Treatment Therapies:    Pain procedures: none  Physical Therapy: none  Chiropractor: none  Acupuncture: none  TENS unit: none  Spinal decompression: none  Joint replacement: knee surgeries    Patient denies urinary incontinence and bowel incontinence.  Patient denies any suicidal or homicidal ideations     report:  Reviewed and consistent with medication use as prescribed.    Imaging:   MRI Lumbar 03/2024:  FINDINGS:  The distal cord/ conus demonstrates normal size " and appearance.     No evidence of fracture, marrow replacement process, or spondylo-discitis.     No paraspinal masses or inflammatory changes.     Degenerative changes/ spondylosis:     At L1-2, there ismoderate disc bulging and facet joint and ligamentum flavum hypertrophy, contributes to moderate/severe spinal canal stenosis and moderate left and mild right-sided neural foraminal narrowing.     At L2-3, there ismoderate disc bulging and facet arthropathy, contributing to moderate spinal canal stenosis and moderate right and mild left-sided neural foraminal narrowing.     At L3-4, there isprominent disc height loss/degenerative disc disease with sub endplate marrow edema/Modic 1 changes with associated enhancement (series 9, images 9-10).  There is prominent facet joint arthropathy.  These findings contribute to severe spinal canal stenosis and moderate/severe bilateral neural foraminal narrowing.     At L4-5, there ismoderate disc bulging with advanced facet joint arthropathy, noting joint hypertrophy with prominent synovial fluid and surrounding inflammatory change/enhancement.  Grade 1 anterolisthesis noted.  These findings contribute to severe spinal canal stenosis and moderate/severe bilateral neural foraminal narrowing.     At L5-S1, there ismoderate facet arthropathy and mild disc bulging, contributing to mild bilateral neural foraminal narrowing.  No spinal canal stenosis.     Impression:  Lumbar spondylosis, contributing to moderate/ severe spinal canal stenosis L1-2 through L4-5 and moderate/ severe neural foraminal narrowing at L3-4 and L4-5, as above.     Prominent L3-4 degenerative disc disease and L4-5 facet joint arthropathy with grade I anterolisthesis, as above.        5/10/2024    12:05 PM   Pain Disability Index (PDI)   Family/Home Responsibilities: 10   Recreation: 10   Occupation: 10   Sexual Behavior: 10   Self Care: 10   Life-Support Activities: 10   Pain Disability Index (PDI) 70         Past Medical History:   Diagnosis Date    Hyperlipidemia     Hypertension      Past Surgical History:   Procedure Laterality Date    KNEE SURGERY      bilateral     Social History     Socioeconomic History    Marital status:    Tobacco Use    Smoking status: Never    Smokeless tobacco: Never   Substance and Sexual Activity    Alcohol use: Yes     Comment: social    Drug use: No    Sexual activity: Yes     Partners: Female     Social Determinants of Health     Financial Resource Strain: Medium Risk (4/5/2024)    Overall Financial Resource Strain (CARDIA)     Difficulty of Paying Living Expenses: Somewhat hard   Food Insecurity: No Food Insecurity (4/5/2024)    Hunger Vital Sign     Worried About Running Out of Food in the Last Year: Never true     Ran Out of Food in the Last Year: Never true   Transportation Needs: No Transportation Needs (4/5/2024)    PRAPARE - Transportation     Lack of Transportation (Medical): No     Lack of Transportation (Non-Medical): No   Physical Activity: Unknown (4/5/2024)    Exercise Vital Sign     Days of Exercise per Week: Patient declined   Stress: No Stress Concern Present (4/5/2024)    [employer] - Occupational Stress Questionnaire     Feeling of Stress : Not at all   Housing Stability: Unknown (4/5/2024)    Housing Stability Vital Sign     Unable to Pay for Housing in the Last Year: No     Unstable Housing in the Last Year: No     Family History   Problem Relation Name Age of Onset    Cancer Mother      Heart disease Mother      Diabetes Brother         Review of patient's allergies indicates:  No Known Allergies    Current Outpatient Medications   Medication Sig    ascorbic acid, vitamin C, (VITAMIN C) 1000 MG tablet Take 1,000 mg by mouth once daily.    atenoloL (TENORMIN) 50 MG tablet TAKE 1 TABLET BY MOUTH EVERY DAY    calcium carbonate (OS-MELISSA) 600 mg calcium (1,500 mg) Tab Take 2,000 mg by mouth once.    cyanocobalamin (VITAMIN B-12)  "1000 MCG tablet Take 5,000 mcg by mouth once daily.    DULoxetine (CYMBALTA) 30 MG capsule TAKE ONE CAPSULE BY MOUTH ONCE DAILY    fish oil-omega-3 fatty acids 300-1,000 mg capsule Take by mouth once daily.    gabapentin (NEURONTIN) 300 MG capsule Take 1 capsule (300 mg total) by mouth 3 (three) times daily.    LORazepam (ATIVAN) 1 MG tablet Please take one tablet 30 minutes prior to MRI, you may take a second dose immediately before the MRI if needed. Do not drive after taking this medication.    meloxicam (MOBIC) 15 MG tablet Take 1 tablet (15 mg total) by mouth once daily.    methocarbamoL (ROBAXIN) 500 MG Tab Take 1 tablet (500 mg total) by mouth 3 (three) times daily.    multivit-min/FA/lycopen/lutein (CENTRUM SILVER MEN ORAL) Take by mouth.    potassium chloride SA (K-DUR,KLOR-CON) 20 MEQ tablet Take 99 mEq by mouth 2 (two) times daily.    simvastatin (ZOCOR) 80 MG tablet TAKE ONE TABLET BY MOUTH EVERY EVENING    syringe with needle (BD INTEGRA SYRINGE) 3 mL 21 gauge x 1 1/2" Syrg Inject as directed    testosterone cypionate (DEPOTESTOTERONE CYPIONATE) 200 mg/mL injection INJECT 1 ML INTRAMUSCULARLY EVERY 14 DAYS     No current facility-administered medications for this visit.       REVIEW OF SYSTEMS:    GENERAL:  No weight loss, malaise or fevers.  HEENT:   No recent changes in vision or hearing  NECK:  Negative for lumps, no difficulty with swallowing.  RESPIRATORY:  Negative for cough, wheezing or shortness of breath, patient denies any recent URI.  CARDIOVASCULAR:  Negative for chest pain, leg swelling or palpitations.  GI:  Negative for abdominal discomfort, blood in stools or black stools or change in bowel habits.  MUSCULOSKELETAL:  See HPI.  SKIN:  Negative for lesions, rash, and itching.  PSYCH:  No mood disorder or recent psychosocial stressors.  Patients sleep is not disturbed secondary to pain.  HEMATOLOGY/LYMPHOLOGY:  Negative for prolonged bleeding, bruising easily or swollen nodes.  Patient is " "not currently taking any anti-coagulants  NEURO:   No history of headaches, syncope, paralysis, seizures or tremors.  All other reviewed and negative other than HPI.    OBJECTIVE:    BP (!) 143/93 (BP Location: Left arm, Patient Position: Sitting, BP Method: Medium (Automatic))   Pulse 68   Ht 5' 8" (1.727 m)   Wt 81.7 kg (180 lb 1.9 oz)   BMI 27.39 kg/m²     PHYSICAL EXAMINATION:    GENERAL: Well appearing, in no acute distress, alert and oriented x3.  PSYCH:  Mood and affect appropriate.  SKIN: Skin color, texture, turgor normal, no rashes or lesions.  HEAD/FACE:  Normocephalic, atraumatic. Cranial nerves grossly intact.  CV: RRR with palpation of the radial artery.  PULM: CTAB. No evidence of respiratory difficulty, symmetric chest rise.  GI:  Soft and non-tender.    BACK:   - No obvious deformity or signs of trauma, Normal lumbar lordotic curve  - Negative spinous process tenderness  - Negative paravertebral tenderness  - Negative pain to palpation over the facet joints of the lumbar spine.   - Negative QL / Iliac crest / Glut tenderness  - Slump test is Negative for radicular pain  - Slump test is Negative for back pain  - Supine Straight leg raising is Negative for radicular pain  - Supine Straight leg raising is Negative for back pain  - Lumbar ROM is diminished in Flexion without pain  - Lumbar ROM is diminished in Extension with pain  - Lumbar ROM is diminished in Lateral Flexion with pain  - Positive Sustained Hip Flexion test (for discogenic pain)  - Positive Altered Gait, Posture  - Axial facet loading test Negative on the bilateral side(s)    SI Joint exam:  - Negative SI joint tenderness to palpation  - Sky's sign Negative  - Yeoman's Test: Did not perform for SI joint pain indicating anterior SI ligament involvement. Did not perform for anterior thigh pain/paresthesia which indicates femoral nerve stretch.  - Gaenslen's Test:Did not perform  - Finger Ilana's Sign:Negative  - SI compression " test:Did not perform  - SI distraction test:Did not perform  - Thigh Thrust: Did not perform  - SI Thrust: Did not perform    MUSKULOSKELETAL:    Muscle atrophy of right thigh  Muscle atrophy of b/l calves  [place] legged b/l    NEUROLOGICAL EXAM:  MENTAL STATUS: A x O x 3, good concentration, speech is fluent and goal directed  MEMORY: recent and remote are intact  CN: CN2-12 grossly intact  MOTOR: 5/5 in all muscle groups except left KE 4+/5  DTRs: 2+ intact symmetric  Sensation:    -yes Loss of sensation in a left lower and right lower  L4/5/S1  distribution.  Babinski: absent on the bilateral side(s)    GAIT: abnormal.

## 2024-05-10 NOTE — H&P (VIEW-ONLY)
Chronic Pain - New Consult    Referring Physician: Erick Gustafson MD    Date: 05/10/2024     Re: Familia Mccarthy  MR#: [id number]  YOB: 1965  Age: 58 y.o.    Chief Complaint:   Chief Complaint   Patient presents with    Back Pain     Lumbar back pain, 10/10 pain level today.      **This note is dictated using the M*Modal Fluency Direct word recognition program. There are word recognition mistakes that are occasionally missed on review.**    ASSESSMENT: 58 y.o. year old male with Lumbar back pain 10/10, consistent with     1. Lumbar stenosis with neurogenic claudication  Ambulatory referral/consult to Pain Clinic    Procedure Order to Pain Management      2. Lumbar radiculopathy          PLAN:     Lumbar spinal stenosis with neurogenic claudication and radiculopathy  -increase gabapentin to 600mg TID. He can send me a message when he needs a refill.  -patient has very classes spinal stensois symptoms.  Discussed that he will most likely need surgery.  My concern is the muscle atrophy and weakness that he is having  -We will schedule the patient for L5-S1 MARI (low volume).  Risks,benefits, and alternatives of the procedure were discussed with the patient and He would like to proceed with the procedure.  At this juncture, we believe that the patient's medical comorbidity status adds medium risk and complexity to our proposed evaluation and treatment.  -if L5-S1 MARI not helpful, then will try b/l L4-5 TFESI    - RTC after MARI  - Counseled patient regarding the importance of weight loss and activity modification and physical therapy.    The above plan and management options were discussed at length with patient. Patient is in agreement with the above and verbalized understanding. It will be communicated with the referring physician via electronic record, fax, or mail.  Lab/study reports reviewed were important and necessary because subsequent medical and treatment recommendations required review of the  "above lab/study reports. Images viewed/reviewed above were important and necessary because subsequent medical and treatment recommendations required review of the reviewed image(s).     Electronically signed by:  Jorge Alberto Wilson DO  05/10/2024    =========================================================================================================    SUBJECTIVE:    Familia Mccarthy is a 58 y.o. male presents to the clinic for the evaluation of lumbar back pain. The pain started 7/8 years ago following no inciting event and symptoms have been worsening. The patient states that he has a history of knee surgeries when he was a kid and is knock kneed as a result.      He is getting muscle atrophy of the right thigh > left thigh.    Pain Description:    The pain is located in the lower back pain area and radiates to both legs (knees and calves).    At BEST  10/10   At WORST  10/10 on the WORST day.    On average pain is rated as 9/10.   Today the pain is rated as 10/10  The pain is continuous.  The pain is described as burning, numbing, and "feels like I'm carrying a thousand pounds"     Symptoms interfere with daily activity.   Exacerbating factors: Standing and Walking.    Mitigating factors medications.   He reports 8 hours of sleep per night.    Physical Therapy/Home Exercise: Yes, currently has a home exercise program    Current Pain Medications:    - gabapentin, methocarbamol, meloxicam    Failed Pain Medications:    - tylenol    Pain Treatment Therapies:    Pain procedures: none  Physical Therapy: none  Chiropractor: none  Acupuncture: none  TENS unit: none  Spinal decompression: none  Joint replacement: knee surgeries    Patient denies urinary incontinence and bowel incontinence.  Patient denies any suicidal or homicidal ideations     report:  Reviewed and consistent with medication use as prescribed.    Imaging:   MRI Lumbar 03/2024:  FINDINGS:  The distal cord/ conus demonstrates normal size " and appearance.     No evidence of fracture, marrow replacement process, or spondylo-discitis.     No paraspinal masses or inflammatory changes.     Degenerative changes/ spondylosis:     At L1-2, there ismoderate disc bulging and facet joint and ligamentum flavum hypertrophy, contributes to moderate/severe spinal canal stenosis and moderate left and mild right-sided neural foraminal narrowing.     At L2-3, there ismoderate disc bulging and facet arthropathy, contributing to moderate spinal canal stenosis and moderate right and mild left-sided neural foraminal narrowing.     At L3-4, there isprominent disc height loss/degenerative disc disease with sub endplate marrow edema/Modic 1 changes with associated enhancement (series 9, images 9-10).  There is prominent facet joint arthropathy.  These findings contribute to severe spinal canal stenosis and moderate/severe bilateral neural foraminal narrowing.     At L4-5, there ismoderate disc bulging with advanced facet joint arthropathy, noting joint hypertrophy with prominent synovial fluid and surrounding inflammatory change/enhancement.  Grade 1 anterolisthesis noted.  These findings contribute to severe spinal canal stenosis and moderate/severe bilateral neural foraminal narrowing.     At L5-S1, there ismoderate facet arthropathy and mild disc bulging, contributing to mild bilateral neural foraminal narrowing.  No spinal canal stenosis.     Impression:  Lumbar spondylosis, contributing to moderate/ severe spinal canal stenosis L1-2 through L4-5 and moderate/ severe neural foraminal narrowing at L3-4 and L4-5, as above.     Prominent L3-4 degenerative disc disease and L4-5 facet joint arthropathy with grade I anterolisthesis, as above.        5/10/2024    12:05 PM   Pain Disability Index (PDI)   Family/Home Responsibilities: 10   Recreation: 10   Occupation: 10   Sexual Behavior: 10   Self Care: 10   Life-Support Activities: 10   Pain Disability Index (PDI) 70         Past Medical History:   Diagnosis Date    Hyperlipidemia     Hypertension      Past Surgical History:   Procedure Laterality Date    KNEE SURGERY      bilateral     Social History     Socioeconomic History    Marital status:    Tobacco Use    Smoking status: Never    Smokeless tobacco: Never   Substance and Sexual Activity    Alcohol use: Yes     Comment: social    Drug use: No    Sexual activity: Yes     Partners: Female     Social Determinants of Health     Financial Resource Strain: Medium Risk (4/5/2024)    Overall Financial Resource Strain (CARDIA)     Difficulty of Paying Living Expenses: Somewhat hard   Food Insecurity: No Food Insecurity (4/5/2024)    Hunger Vital Sign     Worried About Running Out of Food in the Last Year: Never true     Ran Out of Food in the Last Year: Never true   Transportation Needs: No Transportation Needs (4/5/2024)    PRAPARE - Transportation     Lack of Transportation (Medical): No     Lack of Transportation (Non-Medical): No   Physical Activity: Unknown (4/5/2024)    Exercise Vital Sign     Days of Exercise per Week: Patient declined   Stress: No Stress Concern Present (4/5/2024)    [employer] [place] [employer] - Occupational Stress Questionnaire     Feeling of Stress : Not at all   Housing Stability: Unknown (4/5/2024)    Housing Stability Vital Sign     Unable to Pay for Housing in the Last Year: No     Unstable Housing in the Last Year: No     Family History   Problem Relation Name Age of Onset    Cancer Mother      Heart disease Mother      Diabetes Brother         Review of patient's allergies indicates:  No Known Allergies    Current Outpatient Medications   Medication Sig    ascorbic acid, vitamin C, (VITAMIN C) 1000 MG tablet Take 1,000 mg by mouth once daily.    atenoloL (TENORMIN) 50 MG tablet TAKE 1 TABLET BY MOUTH EVERY DAY    calcium carbonate (OS-MELISSA) 600 mg calcium (1,500 mg) Tab Take 2,000 mg by mouth once.    cyanocobalamin (VITAMIN B-12)  "1000 MCG tablet Take 5,000 mcg by mouth once daily.    DULoxetine (CYMBALTA) 30 MG capsule TAKE ONE CAPSULE BY MOUTH ONCE DAILY    fish oil-omega-3 fatty acids 300-1,000 mg capsule Take by mouth once daily.    gabapentin (NEURONTIN) 300 MG capsule Take 1 capsule (300 mg total) by mouth 3 (three) times daily.    LORazepam (ATIVAN) 1 MG tablet Please take one tablet 30 minutes prior to MRI, you may take a second dose immediately before the MRI if needed. Do not drive after taking this medication.    meloxicam (MOBIC) 15 MG tablet Take 1 tablet (15 mg total) by mouth once daily.    methocarbamoL (ROBAXIN) 500 MG Tab Take 1 tablet (500 mg total) by mouth 3 (three) times daily.    multivit-min/FA/lycopen/lutein (CENTRUM SILVER MEN ORAL) Take by mouth.    potassium chloride SA (K-DUR,KLOR-CON) 20 MEQ tablet Take 99 mEq by mouth 2 (two) times daily.    simvastatin (ZOCOR) 80 MG tablet TAKE ONE TABLET BY MOUTH EVERY EVENING    syringe with needle (BD INTEGRA SYRINGE) 3 mL 21 gauge x 1 1/2" Syrg Inject as directed    testosterone cypionate (DEPOTESTOTERONE CYPIONATE) 200 mg/mL injection INJECT 1 ML INTRAMUSCULARLY EVERY 14 DAYS     No current facility-administered medications for this visit.       REVIEW OF SYSTEMS:    GENERAL:  No weight loss, malaise or fevers.  HEENT:   No recent changes in vision or hearing  NECK:  Negative for lumps, no difficulty with swallowing.  RESPIRATORY:  Negative for cough, wheezing or shortness of breath, patient denies any recent URI.  CARDIOVASCULAR:  Negative for chest pain, leg swelling or palpitations.  GI:  Negative for abdominal discomfort, blood in stools or black stools or change in bowel habits.  MUSCULOSKELETAL:  See HPI.  SKIN:  Negative for lesions, rash, and itching.  PSYCH:  No mood disorder or recent psychosocial stressors.  Patients sleep is not disturbed secondary to pain.  HEMATOLOGY/LYMPHOLOGY:  Negative for prolonged bleeding, bruising easily or swollen nodes.  Patient is " "not currently taking any anti-coagulants  NEURO:   No history of headaches, syncope, paralysis, seizures or tremors.  All other reviewed and negative other than HPI.    OBJECTIVE:    BP (!) 143/93 (BP Location: Left arm, Patient Position: Sitting, BP Method: Medium (Automatic))   Pulse 68   Ht 5' 8" (1.727 m)   Wt 81.7 kg (180 lb 1.9 oz)   BMI 27.39 kg/m²     PHYSICAL EXAMINATION:    GENERAL: Well appearing, in no acute distress, alert and oriented x3.  PSYCH:  Mood and affect appropriate.  SKIN: Skin color, texture, turgor normal, no rashes or lesions.  HEAD/FACE:  Normocephalic, atraumatic. Cranial nerves grossly intact.  CV: RRR with palpation of the radial artery.  PULM: CTAB. No evidence of respiratory difficulty, symmetric chest rise.  GI:  Soft and non-tender.    BACK:   - No obvious deformity or signs of trauma, Normal lumbar lordotic curve  - Negative spinous process tenderness  - Negative paravertebral tenderness  - Negative pain to palpation over the facet joints of the lumbar spine.   - Negative QL / Iliac crest / Glut tenderness  - Slump test is Negative for radicular pain  - Slump test is Negative for back pain  - Supine Straight leg raising is Negative for radicular pain  - Supine Straight leg raising is Negative for back pain  - Lumbar ROM is diminished in Flexion without pain  - Lumbar ROM is diminished in Extension with pain  - Lumbar ROM is diminished in Lateral Flexion with pain  - Positive Sustained Hip Flexion test (for discogenic pain)  - Positive Altered Gait, Posture  - Axial facet loading test Negative on the bilateral side(s)    SI Joint exam:  - Negative SI joint tenderness to palpation  - Sky's sign Negative  - Yeoman's Test: Did not perform for SI joint pain indicating anterior SI ligament involvement. Did not perform for anterior thigh pain/paresthesia which indicates femoral nerve stretch.  - Gaenslen's Test:Did not perform  - Finger Ilana's Sign:Negative  - SI compression " test:Did not perform  - SI distraction test:Did not perform  - Thigh Thrust: Did not perform  - SI Thrust: Did not perform    MUSKULOSKELETAL:    Muscle atrophy of right thigh  Muscle atrophy of b/l calves  [place] legged b/l    NEUROLOGICAL EXAM:  MENTAL STATUS: A x O x 3, good concentration, speech is fluent and goal directed  MEMORY: recent and remote are intact  CN: CN2-12 grossly intact  MOTOR: 5/5 in all muscle groups except left KE 4+/5  DTRs: 2+ intact symmetric  Sensation:    -yes Loss of sensation in a left lower and right lower  L4/5/S1  distribution.  Babinski: absent on the bilateral side(s)    GAIT: abnormal.

## 2024-05-10 NOTE — TELEPHONE ENCOUNTER
----- Message from Jorge Alberto Barriga DO sent at 5/10/2024 12:03 PM CDT -----  Regarding: Order for FINA SUNSHINE    Patient Name: FINA SUNSHINE([id number])  Sex: Male  : 1965      PCP: RENETTA OLGUIN    Center: [facility] CENTRAL BILLING OFFICE     Types of orders made on 05/10/2024: Outpatient Referral, Procedure Request    Order Date:5/10/2024  Ordering User:JORGE ALBERTO BARRIGA [[id number]]  Encounter Provider:Jorge Alberto Barriga DO [[id number]]  Authorizing Provider: Jorge Alberto Barriga DO [[id number]]  Department:[facility][[id number]]    Common Order Information  Procedure -> Epidural Injection (specify level) Cmt: L5-S1 MARI    Pre-op Diagnosis -> Lumbar radiculopathy     Order Specific Information  Order: Procedure Order to Pain Management [Custom: [id number]]  Order #:          [id number]: 1 FUTURE    Priority: Routine    Class: Clinic Performed    Future Order Information      Expires on:05/10/2025            Expected by:05/10/2024                   Comment:24 - L5-S1 MARI (low volume) - no sed - no AC    Associated Diagnoses      M48.062 Lumbar stenosis with neurogenic claudication      Physician -> [email or web address]         Is patient on anti-coagulants? -> No         Facility Name: -> [facility]           Priority: Routine  Class:   Clinic Performed    Future Order Information      Expires on:05/10/2025            Expected by:05/10/2024                   Comment:24 - L5-S1 MARI (low volume) - no sed - no AC    Associated Diagnoses      M48.062 Lumbar stenosis with neurogenic claudication      Procedure -> Epidural Injection (specify level) Cmt: L5-S1 MARI        Physician -> mahinu         Is patient on anti-coagulants? -> No         Pre-op Diag  nosis -> Lumbar radiculopathy         Facility Name: -> [facility]

## 2024-05-21 ENCOUNTER — TELEPHONE (OUTPATIENT)
Dept: PAIN MEDICINE | Facility: CLINIC | Age: 59
End: 2024-05-21
Payer: COMMERCIAL

## 2024-05-24 NOTE — PRE-PROCEDURE INSTRUCTIONS
Patient reviewed on 5/24/2024.  Okay to proceed at [place]. The following pre-procedure instructions and arrival time have been reviewed with patient via phone and sent to patient portal for review.  Patient verbalized an understanding.      Dear Familia ,     You are scheduled for a procedure with Dr. Jorge Alberto Wilson on 5/28/2024.  Your scheduled arrival time is 2:00 pm.  This arrival time is roughly 1 hour before your anticipated procedure time to allow sufficient time for pre-op..  You will need to change into a gown for this procedure.  This procedure will take place at the [facility] at the corner of [facility] and [facility].  It is in the [place] next to [facility].  The address is:     [address] [place] [address].  [place] [id number]     After entering the building, you will proceed to the second floor where you can check in with registration. You should take any medications that you routinely take for blood pressure, heart medications, thyroid, cholesterol, etc.      The fasting restrictions are dependent on whether or not you are receiving sedation.  Sedation is not available for all procedures.      Your fasting instructions are as follow:  No sedation.  You do not need to fast before this procedure.  You can eat and drink like normal.  You can drive yourself (with stipulations).  There are some rare cases where you may need to call an uber if you are unable to drive after the procedure due to weakness/dizziness.      If you are on blood thinners, you need to follow the anticoagulation instructions that had been discussed previously.  You should only stop the blood thinners if it was approved by your primary care physician or your cardiologist.  In the event that you are not able to stop your blood thinners, a blood thinner was not listed on your medication list, or we were not able to get clearance from your cardiologist, then the procedure may have to be  postponed/canceled.      IF you were told to stop your blood thinners, this is how long you should generally hold some of the more common ones.  Remember that stopping blood thinners is only necessary for certain procedures. If you are unsure of your instructions, please call us.   Aspirin - 5 days  Plavix/Clopidogrel - 7 days  Warfarin / Coumadin - 5 days  Eliquis - 3 days  Pradaxa/Dabigatran - 4 days  Xarelto/Rivaroxaban - 3 days     If you are a diabetic, do not take your medication if you will be fasting, but bring it with you. Please plan on being here for roughly 2-3 hours.     Please call us if you have been sick (running fever, having any flu-like symptoms) or have been taking antibiotics in the past 2 weeks or had any outpatient procedures other than with us (colonoscopy, endoscopy, OBGYN, dental, etc.). If you have been previously COVID positive, you will need to hold off on your procedure until you are symptom free for 10 days. If you did not have any symptoms, you can have your procedure 10 days from your positive test result.       *HOLD ALL VITAMINS, MINERALS, HERBS (INCLUDING HERBAL TEAS) AND SUPPLEMENTS  *SHOWER WITH ANTIBACTERIAL SOAP (EX. DIAL) NIGHT BEFORE AND MORNING OF PROCEDURE  *DO NOT APPLY ANY LOTIONS, OILS, POWDERS, PERFUME/COLOGNE, OINTMENTS, GELS, CREAMS, MAKEUP OR DEODORANT TO YOUR SKIN MORNING OF PROCEDURE  *LEAVE JEWELRY AND ANY VALUABLES AT HOME  *WEAR LOOSE COMFORTABLE CLOTHING (PREFERABLY A BUTTON UP SHIRT)     Please reply to this message as receipt of delivery        Thank you,  [facility]  [place], LPN [facility]  Pre-Admit

## 2024-05-28 ENCOUNTER — HOSPITAL ENCOUNTER (OUTPATIENT)
Facility: HOSPITAL | Age: 59
Discharge: HOME | End: 2024-05-28
Attending: STUDENT IN AN ORGANIZED HEALTH CARE EDUCATION/TRAINING PROGRAM | Admitting: STUDENT IN AN ORGANIZED HEALTH CARE EDUCATION/TRAINING PROGRAM
Payer: COMMERCIAL

## 2024-05-28 VITALS
OXYGEN SATURATION: 98 % | SYSTOLIC BLOOD PRESSURE: 132 MMHG | HEART RATE: 58 BPM | RESPIRATION RATE: 18 BRPM | DIASTOLIC BLOOD PRESSURE: 72 MMHG | TEMPERATURE: 98 F

## 2024-05-28 DIAGNOSIS — M51.36 DDD (DEGENERATIVE DISC DISEASE), LUMBAR: ICD-10-CM

## 2024-05-28 DIAGNOSIS — G89.29 CHRONIC PAIN: ICD-10-CM

## 2024-05-28 DIAGNOSIS — M54.17 LUMBOSACRAL RADICULOPATHY: Primary | ICD-10-CM

## 2024-05-28 PROCEDURE — 62323 NJX INTERLAMINAR LMBR/SAC: CPT | Mod: ,,, | Performed by: STUDENT IN AN ORGANIZED HEALTH CARE EDUCATION/TRAINING PROGRAM

## 2024-05-28 PROCEDURE — 62323 NJX INTERLAMINAR LMBR/SAC: CPT | Performed by: STUDENT IN AN ORGANIZED HEALTH CARE EDUCATION/TRAINING PROGRAM

## 2024-05-28 RX ORDER — GABAPENTIN 600 MG/1
600 TABLET ORAL 3 TIMES DAILY
Qty: 270 TABLET | Refills: 3 | Status: SHIPPED | OUTPATIENT
Start: 2024-05-28 | End: 2025-05-28

## 2024-05-28 NOTE — PLAN OF CARE
Pt to bay 22. VS stable. Surgeon to bedside to consent and cecilia patient. Pre-op questionnaire completed with patient. Pre-procedure routine reviewed with patient

## 2024-05-28 NOTE — OP NOTE
"PROCEDURE:  LUMBAR L5-S1 INTERLAMINAR EPIDURAL STEROID INJECTION    Patient Name: Familia Mccarthy  MRN: [id number]  DATE OF PROCEDURE: 05/28/2024    INJECTION # 1    DIAGNOSIS: Lumbar Radiculopathy  CPT CODE: 63043      POSTPROCEDURE DIAGNOSIS: Same    PHYSICIAN: Jorge Alberto Wilson DO  NEEDLE TYPE: - 20G 3.5" Touhy Needle  MEDICATIONS INJECTED: 4cc mixture of 3cc Normal Saline + 10mg Dexamethasone (10mg/ml)  CONTRAST: Omni 300  LOSS OF RESISTANCE DEPTH: 6 cm    Sedation Medications - None    Estimated Blood Loss - <2ml  Drains: None  Specimens Removed: None  Urine Output - Not Measured  Complications: vasovagal. Resolved spontaneously  Outcome: Good    Informed Consent:  The patient's condition and proposed procedures, risks, and alternatives were discussed with the patient or responsible party.  The patient's / responsible party's questions were answered.   The patient / responsible party appeared to understand and chose to proceed.  Informed consent was obtained.  After obtaining written consent, an IV hep lock was placed. (See nurses notes for details).     Procedure in Detail:  The patient was taken back to the OR suite and placed in a prone position. The skin overlying the injection site was prepped and draped in an aseptic fashion. The target injection site (see above) was identified with fluoroscopy and marked.     Procedural Pause:  A procedural pause verifying correct patient, medical record number, allergies, medications to be administered, current vital signs, and surgical site was performed immediately prior to beginning the procedure.    The skin and subcutaneous tissue overlying the target site of injection for the L5-S1 epidural steroid injection was/were anesthetized using 4 mL of 2% lidocaine with a 25-gauge, 1½-inch needle.  The above noted Tuohy needle was advanced under fluoroscopic guidance towards the epidural space. Lateral fluoroscopic imaging was used to confirm depth. The epidural " space was identified using a loss of resistance to saline technique. (See above for loss of resistance depth). A microbore extension tubing was attached to the needle to minimize any movement of the needle during injection or syringe change.  After negative aspiration for heme or CSF, 1ml of contrast was injected to confirm placement and no intrathecal or vascular spread.  After repeat negative aspiration for heme or CSF, the above noted steroid solution was slowly injected in increments. The needle was then retracted approximately USP and the needle track was flushed with 0.5 ml of Lidocaine 2% to clear the needle prior to removal. The Tuohy needle was then removed.     The heart rate, pulse oximetry, and blood pressure were continuously monitored throughout the procedure.  The prpocedure was well tolerated. He was carefully escorted to the recovery room in stable condition. Patient was monitored by RN for recovery period.  The patient will be contacted in the next few days to determine extent of relief.  Patient was given post procedure and discharge instructions to follow at home.  The patient was discharged in a stable condition.    Note Electronically Signed By:  Jorge Alberto Keene

## 2024-05-28 NOTE — DISCHARGE INSTRUCTIONS
[facility]  [employer] service # [phone]    POST-PROCEDURE INSTRUCTIONS:    Today you had an injection that included a steroid medications.  The steroid may or may not have been mixed with a local anesthetic when it was injected.   If the injection was in the neck, you may feel some pressure, numbness, or slight weakness in the arm after the procedure for a short period of time (this is a normal response), if this persists for longer than 1 day please contact our office or go to the emergency room.  If the injection was in the low back, you may feel some pressure, numbness, or slight weakness in the leg after the procedure for a short period of time (this is a normal response), if this persists for longer than 1 day please contact our office or go to the emergency room.  You may get side effects from the steroid.  This is not uncommon.  Symptoms include: elevated blood sugar, elevated blood pressure, headache, flushing, nausea, insomnia.  These symptoms are transient and will resolve within 1-3 days.  If symptoms last longer than this please contact our office or head to the emergency room.  Steroid medications can take anywhere from 3-14 days to take effect (rarely longer).  You may notice that your pain worsens for a short period of time after the injection, this would not be unusual due to the pressure and trauma from the needle.    If you do not have a follow up appointment scheduled, please contact my office (or the office of the physician who referred you for the procedure) to get a post-procedure follow up scheduled 2-4 weeks after the procedure.  This can be done as a virtual visit if that is more convenient for you.      What you need to do:    Keep a record of your response to the injection you had today.    How much relief did you get?   When did the relief start and how long did it last?  Were you able to decrease the use of any of your pain  medications?  Were you able to increase your level of activity?  How long did the relief last?    What to watch out for:    If you experience any of the following symptoms after your procedure, please notify the messaging service immediately (see above for contact information):   fever (increased oral temperature)   bleeding or swelling at the injection site,    drainage, rash or redness at the injection site    possible signs of infection    increased pain at the injection site   worsening of your usual pain   severe headache   new or worsening numbness    new arm and/or leg weakness, or    changes in bowel and/or bladder function: urinating or defecating on yourself and not knowing that you did it.    PLEASE FOLLOW ALL INSTRUCTIONS CAREFULLY     Do not engage in strenuous activity (e.g., lifting or pushing heavy objects or repeated bending) for 24 hours.     Do not take a bath, swim or use Jacuzzi for 24 hours after procedure. (A shower is fine).   Remove any Band-Aids when you get home.    Use cold/ice, as needed for comfort.  We recommend the use of cold therapy alternating on for 20 minutes, off for 20 minutes.    Do not apply direct heat (heating pad or heat packs) to the injection site for 24 hours.     Resume your usual medications, unless instructed otherwise by your Pain Physician.     If you are on warfarin (Coumadin) or other blood thinner, resume this medication as instructed by your prescribing Physician.    IF AT ANY POINT YOU ARE VERY CONCERNED ABOUT YOUR SYMPTOMS, PLEASE GO TO THE EMERGENCY ROOM.    If you develop worsening pain, weakness, numbness, lose bowel or bladder control (i.e., having an accident where you did not even know you had to go to the bathroom and suddenly noticed you soiled yourself), saddle anesthesia (a loss of sensation restricted to the area of the buttocks, anus and between the legs -- i.e., those parts of your body that would touch a saddle if you were sitting on one) you  need to go immediately to the emergency department for evaluation and treatment.    ----------------------------------------------------------------------------------------------------------------------------------------------------------------  If you received Sedation please read the following instructions:  POST SEDATION INSTRUCTIONS    Today you received intravenous medication (also known as sedation) that was used to help you relax and/or decrease discomfort during your procedure. This medication will be acting in your body for the next 24 hours, so you might feel a little tired or sleepy. This feeling will slowly wear off.   Common side effects associated with these medications include: drowsiness, dizziness, sleepiness, confusion, feeling excited, difficulty remembering things, lack of steadiness with walking or balance, loss of fine muscle control, slowed reflexes, difficulty focusing, and blurred vision.  Some over-the-counter and prescription medications (e.g., muscle relaxants, opioids, mood-altering medications, sedatives/hypnotics, antihistamines) can interact with the intravenous medication you received and cause an increased risk of the side effects listed above in addition to other potentially life threatening side effects. Use extreme caution if you are taking such medications, and consult with your Pain Physician or prescribing physician if you have any questions.  For the next 12-24 hours:    DO NOT--Drive a car, operate machinery or power tools   DO NOT--Drink any alcoholic beverages (not even beer), they may dangerously increase the risk of side effects.    DO NOT--Make any important legal or business decisions or sign important documents.  We advise you to have someone to assist you at home. Move slowly and carefully. Do not make sudden changes in position. Be aware of dizziness or light-headedness and move accordingly.   If you seek medical treatment within 24 hours, let the nurse or doctor  caring for you know that you have received the above medications. If you have any questions or concerns related to your sedation or treatment today please contact us.

## 2024-05-28 NOTE — DISCHARGE SUMMARY
[facility]  Discharge Note  Short Stay    Procedure(s) (LRB):  L5-S1 MARI (low volume) (N/A)      OUTCOME: Patient tolerated treatment/procedure well without complication and is now ready for discharge.    DISPOSITION: Home or Self Care    FINAL DIAGNOSIS:  <principal problem not specified>    FOLLOWUP: In clinic    DISCHARGE INSTRUCTIONS:  No discharge procedures on file.     TIME SPENT ON DISCHARGE: 10 minutes

## 2024-05-29 DIAGNOSIS — M48.062 LUMBAR STENOSIS WITH NEUROGENIC CLAUDICATION: ICD-10-CM

## 2024-05-29 RX ORDER — METHOCARBAMOL 500 MG/1
500 TABLET, FILM COATED ORAL 3 TIMES DAILY
Qty: 60 TABLET | Refills: 1 | Status: SHIPPED | OUTPATIENT
Start: 2024-05-29

## 2024-06-07 DIAGNOSIS — E78.5 HYPERLIPIDEMIA, UNSPECIFIED HYPERLIPIDEMIA TYPE: ICD-10-CM

## 2024-06-07 DIAGNOSIS — M79.18 CHRONIC MUSCULOSKELETAL PAIN: ICD-10-CM

## 2024-06-07 DIAGNOSIS — F32.A DEPRESSION, UNSPECIFIED DEPRESSION TYPE: ICD-10-CM

## 2024-06-07 DIAGNOSIS — G89.29 CHRONIC MUSCULOSKELETAL PAIN: ICD-10-CM

## 2024-06-07 RX ORDER — DULOXETIN HYDROCHLORIDE 30 MG/1
CAPSULE, DELAYED RELEASE ORAL
Qty: 90 CAPSULE | Refills: 3 | Status: SHIPPED | OUTPATIENT
Start: 2024-06-07

## 2024-06-07 RX ORDER — SIMVASTATIN 80 MG/1
TABLET, FILM COATED ORAL
Qty: 90 TABLET | Refills: 3 | Status: SHIPPED | OUTPATIENT
Start: 2024-06-07

## 2024-06-07 NOTE — TELEPHONE ENCOUNTER
Refill Decision Note   Familia Mccarthy  is requesting a refill authorization.  Brief Assessment and Rationale for Refill:  Approve     Medication Therapy Plan:         Comments:     Note composed:9:42 AM 06/07/2024

## 2024-06-07 NOTE — TELEPHONE ENCOUNTER
No care due was identified.  [facility] Embedded Care Due Messages. Reference number: [id number].   6/07/2024 9:19:37 AM CDT

## 2024-06-27 ENCOUNTER — OFFICE VISIT (OUTPATIENT)
Dept: PAIN MEDICINE | Facility: CLINIC | Age: 59
End: 2024-06-27
Payer: COMMERCIAL

## 2024-06-27 VITALS
HEIGHT: 68 IN | DIASTOLIC BLOOD PRESSURE: 86 MMHG | WEIGHT: 180.13 LBS | BODY MASS INDEX: 27.3 KG/M2 | HEART RATE: 62 BPM | SYSTOLIC BLOOD PRESSURE: 147 MMHG

## 2024-06-27 DIAGNOSIS — M48.062 LUMBAR STENOSIS WITH NEUROGENIC CLAUDICATION: ICD-10-CM

## 2024-06-27 DIAGNOSIS — M54.17 LUMBOSACRAL RADICULOPATHY: Primary | ICD-10-CM

## 2024-06-27 DIAGNOSIS — M51.36 DDD (DEGENERATIVE DISC DISEASE), LUMBAR: ICD-10-CM

## 2024-06-27 PROCEDURE — 99214 OFFICE O/P EST MOD 30 MIN: CPT | Mod: S$GLB,,, | Performed by: STUDENT IN AN ORGANIZED HEALTH CARE EDUCATION/TRAINING PROGRAM

## 2024-06-27 PROCEDURE — 99999 PR PBB SHADOW E&M-EST. PATIENT-LVL III: CPT | Mod: PBBFAC,,, | Performed by: STUDENT IN AN ORGANIZED HEALTH CARE EDUCATION/TRAINING PROGRAM

## 2024-06-27 RX ORDER — PREGABALIN 75 MG/1
75 CAPSULE ORAL 3 TIMES DAILY
Qty: 90 CAPSULE | Refills: 5 | Status: SHIPPED | OUTPATIENT
Start: 2024-06-27 | End: 2024-12-24

## 2024-06-27 NOTE — PROGRESS NOTES
Chronic Pain - f/u    Referring Physician: No ref. provider found    Date: 06/27/2024     Re: Familia Mccarthy  MR#: [id number]  YOB: 1965  Age: 58 y.o.    Chief Complaint:   Chief Complaint   Patient presents with    Follow-up    Back Pain    Hip Pain     **This note is dictated using the M*Modal Fluency Direct word recognition program. There are word recognition mistakes that are occasionally missed on review.**    ASSESSMENT: 58 y.o. year old male with Lumbar back pain 10/10, consistent with     1. Lumbosacral radiculopathy  pregabalin (LYRICA) 75 MG capsule      2. DDD (degenerative disc disease), lumbar        3. Lumbar stenosis with neurogenic claudication            PLAN:     Lumbar spinal stenosis with neurogenic claudication and radiculopathy  -stop gabapentin 600mg TID  -trial Lyrica 75mg TID  -patient has very classes spinal stensois symptoms.  Discussed that he will most likely need surgery.  My concern is the muscle atrophy and weakness that he is having  5/28/24 - L5-S1 MARI (low volume) - no sed - no AC - vasovagal - no improvement.  -discussed that we could try b/l L4-5 TFESI as another approach to see if that works better  -has appointment on 7/18 with Dr. Gustafson to discuss surgical options.    - RTC after MARI  - Counseled patient regarding the importance of weight loss and activity modification and physical therapy.    The above plan and management options were discussed at length with patient. Patient is in agreement with the above and verbalized understanding. It will be communicated with the referring physician via electronic record, fax, or mail.  Lab/study reports reviewed were important and necessary because subsequent medical and treatment recommendations required review of the above lab/study reports. Images viewed/reviewed above were important and necessary because subsequent medical and treatment recommendations required review of the reviewed image(s).     Electronically signed  "by:  Jorge Alberto SeverinoKeyona,   06/27/2024    =========================================================================================================    SUBJECTIVE:    Interval History 6/27/2024:   Familia Mccarthy is a 58 y.o. male presents to the clinic for follow up.  Since last visit the pain has is unchanged.    The pain is located in the lower back area and radiates to the bilateral hips and legs .  The pain is described as burning and numbing    At BEST  5/10   At WORST  10/10 on the WORST day.    On average pain is rated as 9/10.   Today the pain is rated as 10/10  Symptoms interfere with daily activity.   Exacerbating factors: Standing and Walking.    Mitigating factors medications.     Current pain medications: gabapentin 600mg TID, methocarbamol, meloxicam  Failed Pain Medications: tylenol    Initial hx:  Familia Mccarthy is a 58 y.o. male presents to the clinic for the evaluation of lumbar back pain. The pain started 7/8 years ago following no inciting event and symptoms have been worsening. The patient states that he has a history of knee surgeries when he was a kid and is knock kneed as a result.      He is getting muscle atrophy of the right thigh > left thigh.    Pain Description:    The pain is located in the lower back pain area and radiates to both legs (knees and calves).    At BEST  10/10   At WORST  10/10 on the WORST day.    On average pain is rated as 9/10.   Today the pain is rated as 10/10  The pain is continuous.  The pain is described as burning, numbing, and "feels like I'm carrying a thousand pounds"     Symptoms interfere with daily activity.   Exacerbating factors: Standing and Walking.    Mitigating factors medications.   He reports 8 hours of sleep per night.    Physical Therapy/Home Exercise: Yes, currently has a home exercise program    Current Pain Medications:    - gabapentin, methocarbamol, meloxicam    Failed Pain Medications:    - tylenol    Pain Treatment " Therapies:    Pain procedures: none  Physical Therapy: none  Chiropractor: none  Acupuncture: none  TENS unit: none  Spinal decompression: none  Joint replacement: knee surgeries    Patient denies urinary incontinence and bowel incontinence.  Patient denies any suicidal or homicidal ideations     report:  Reviewed and consistent with medication use as prescribed.    Imaging:   MRI Lumbar 03/2024:  FINDINGS:  The distal cord/ conus demonstrates normal size and appearance.     No evidence of fracture, marrow replacement process, or spondylo-discitis.     No paraspinal masses or inflammatory changes.     Degenerative changes/ spondylosis:     At L1-2, there ismoderate disc bulging and facet joint and ligamentum flavum hypertrophy, contributes to moderate/severe spinal canal stenosis and moderate left and mild right-sided neural foraminal narrowing.     At L2-3, there ismoderate disc bulging and facet arthropathy, contributing to moderate spinal canal stenosis and moderate right and mild left-sided neural foraminal narrowing.     At L3-4, there isprominent disc height loss/degenerative disc disease with sub endplate marrow edema/Modic 1 changes with associated enhancement (series 9, images 9-10).  There is prominent facet joint arthropathy.  These findings contribute to severe spinal canal stenosis and moderate/severe bilateral neural foraminal narrowing.     At L4-5, there ismoderate disc bulging with advanced facet joint arthropathy, noting joint hypertrophy with prominent synovial fluid and surrounding inflammatory change/enhancement.  Grade 1 anterolisthesis noted.  These findings contribute to severe spinal canal stenosis and moderate/severe bilateral neural foraminal narrowing.     At L5-S1, there ismoderate facet arthropathy and mild disc bulging, contributing to mild bilateral neural foraminal narrowing.  No spinal canal stenosis.     Impression:  Lumbar spondylosis, contributing to moderate/ severe spinal  canal stenosis L1-2 through L4-5 and moderate/ severe neural foraminal narrowing at L3-4 and L4-5, as above.     Prominent L3-4 degenerative disc disease and L4-5 facet joint arthropathy with grade I anterolisthesis, as above.        6/27/2024    11:13 AM 5/10/2024    12:05 PM   Pain Disability Index (PDI)   Family/Home Responsibilities: 10 10   Recreation: 10 10   Occupation: 10 10   Sexual Behavior: 10 10   Self Care: 10 10   Life-Support Activities: 10 10   Pain Disability Index (PDI) 70 70        Past Medical History:   Diagnosis Date    Hyperlipidemia     Hypertension      Past Surgical History:   Procedure Laterality Date    EPIDURAL STEROID INJECTION INTO LUMBAR SPINE N/A 5/28/2024    Procedure: L5-S1 MARI (low volume);  Surgeon: Jorge Alberto Wilson DO;  [facility];  Service: Pain Management;  Laterality: N/A;  20 mins    KNEE SURGERY      bilateral     Social History     Socioeconomic History    Marital status:    Tobacco Use    Smoking status: Never    Smokeless tobacco: Never   Substance and Sexual Activity    Alcohol use: Not Currently     Comment: social    Drug use: Yes     Frequency: 7.0 times per week     Types: Marijuana     Comment: daily    Sexual activity: Yes     Partners: Female     Social Determinants of Health     Financial Resource Strain: Medium Risk (4/5/2024)    Overall Financial Resource Strain (CARDIA)     Difficulty of Paying Living Expenses: Somewhat hard   Food Insecurity: No Food Insecurity (4/5/2024)    Hunger Vital Sign     Worried About Running Out of Food in the Last Year: Never true     Ran Out of Food in the Last Year: Never true   Transportation Needs: No Transportation Needs (4/5/2024)    PRAPARE - Transportation     Lack of Transportation (Medical): No     Lack of Transportation (Non-Medical): No   Physical Activity: Unknown (4/5/2024)    Exercise Vital Sign     Days of Exercise per Week: Patient declined   Stress: No Stress Concern Present  "(4/5/2024)    [employer] - Occupational Stress Questionnaire     Feeling of Stress : Not at all   Housing Stability: Unknown (4/5/2024)    Housing Stability Vital Sign     Unable to Pay for Housing in the Last Year: No     Unstable Housing in the Last Year: No     Family History   Problem Relation Name Age of Onset    Cancer Mother      Heart disease Mother      Diabetes Brother         Review of patient's allergies indicates:  No Known Allergies    Current Outpatient Medications   Medication Sig    ascorbic acid, vitamin C, (VITAMIN C) 1000 MG tablet Take 1,000 mg by mouth once daily.    atenoloL (TENORMIN) 50 MG tablet TAKE 1 TABLET BY MOUTH EVERY DAY    calcium carbonate (OS-MELISSA) 600 mg calcium (1,500 mg) Tab Take 2,000 mg by mouth once.    cyanocobalamin (VITAMIN B-12) 1000 MCG tablet Take 5,000 mcg by mouth once daily.    DULoxetine (CYMBALTA) 30 MG capsule TAKE ONE CAPSULE BY MOUTH ONCE DAILY    fish oil-omega-3 fatty acids 300-1,000 mg capsule Take by mouth once daily.    LORazepam (ATIVAN) 1 MG tablet Please take one tablet 30 minutes prior to MRI, you may take a second dose immediately before the MRI if needed. Do not drive after taking this medication.    meloxicam (MOBIC) 15 MG tablet Take 1 tablet (15 mg total) by mouth once daily.    methocarbamoL (ROBAXIN) 500 MG Tab TAKE ONE TABLET BY MOUTH THREE TIMES DAILY    multivit-min/FA/lycopen/lutein (CENTRUM SILVER MEN ORAL) Take by mouth.    potassium chloride SA (K-DUR,KLOR-CON) 20 MEQ tablet Take 99 mEq by mouth 2 (two) times daily.    pregabalin (LYRICA) 75 MG capsule Take 1 capsule (75 mg total) by mouth 3 (three) times daily.    simvastatin (ZOCOR) 80 MG tablet TAKE ONE TABLET BY MOUTH EVERY EVENING    syringe with needle (BD INTEGRA SYRINGE) 3 mL 21 gauge x 1 1/2" Syrg Inject as directed    testosterone cypionate (DEPOTESTOTERONE CYPIONATE) 200 mg/mL injection INJECT 1 ML INTRAMUSCULARLY EVERY 14 DAYS     No current " "facility-administered medications for this visit.       REVIEW OF SYSTEMS:    GENERAL:  No weight loss, malaise or fevers.  HEENT:   No recent changes in vision or hearing  NECK:  Negative for lumps, no difficulty with swallowing.  RESPIRATORY:  Negative for cough, wheezing or shortness of breath, patient denies any recent URI.  CARDIOVASCULAR:  Negative for chest pain, leg swelling or palpitations.  GI:  Negative for abdominal discomfort, blood in stools or black stools or change in bowel habits.  MUSCULOSKELETAL:  See HPI.  SKIN:  Negative for lesions, rash, and itching.  PSYCH:  No mood disorder or recent psychosocial stressors.  Patients sleep is not disturbed secondary to pain.  HEMATOLOGY/LYMPHOLOGY:  Negative for prolonged bleeding, bruising easily or swollen nodes.  Patient is not currently taking any anti-coagulants  NEURO:   No history of headaches, syncope, paralysis, seizures or tremors.  All other reviewed and negative other than HPI.    OBJECTIVE:    BP (!) 147/86 (BP Location: Left arm, Patient Position: Sitting)   Pulse 62   Ht 5' 8" (1.727 m)   Wt 81.7 kg (180 lb 1.9 oz)   BMI 27.39 kg/m²     PHYSICAL EXAMINATION:    GENERAL: Well appearing, in no acute distress, alert and oriented x3.  PSYCH:  Mood and affect appropriate.  SKIN: Skin color, texture, turgor normal, no rashes or lesions.  HEAD/FACE:  Normocephalic, atraumatic. Cranial nerves grossly intact.  CV: RRR with palpation of the radial artery.  PULM: CTAB. No evidence of respiratory difficulty, symmetric chest rise.  GI:  Soft and non-tender.    BACK:   - No obvious deformity or signs of trauma, Normal lumbar lordotic curve  - Negative spinous process tenderness  - Negative paravertebral tenderness  - Negative pain to palpation over the facet joints of the lumbar spine.   - Negative QL / Iliac crest / Glut tenderness  - Slump test is Negative for radicular pain  - Slump test is Negative for back pain  - Supine Straight leg raising is " Negative for radicular pain  - Supine Straight leg raising is Negative for back pain  - Lumbar ROM is diminished in Flexion without pain  - Lumbar ROM is diminished in Extension with pain  - Lumbar ROM is diminished in Lateral Flexion with pain  - Positive Sustained Hip Flexion test (for discogenic pain)  - Positive Altered Gait, Posture  - Axial facet loading test Negative on the bilateral side(s)    SI Joint exam:  - Negative SI joint tenderness to palpation  - Sky's sign Negative  - Yeoman's Test: Did not perform for SI joint pain indicating anterior SI ligament involvement. Did not perform for anterior thigh pain/paresthesia which indicates femoral nerve stretch.  - Gaenslen's Test:Did not perform  - Finger Ilana's Sign:Negative  - SI compression test:Did not perform  - SI distraction test:Did not perform  - Thigh Thrust: Did not perform  - SI Thrust: Did not perform    MUSKULOSKELETAL:    Muscle atrophy of right thigh  Muscle atrophy of b/l calves  [place] legged b/l    NEUROLOGICAL EXAM:  MENTAL STATUS: A x O x 3, good concentration, speech is fluent and goal directed  MEMORY: recent and remote are intact  CN: CN2-12 grossly intact  MOTOR: 5/5 in all muscle groups except left KE 4+/5  DTRs: 2+ intact symmetric  Sensation:    -yes Loss of sensation in a left lower and right lower  L4/5/S1  distribution.  Babinski: absent on the bilateral side(s)    GAIT: abnormal.

## 2024-07-09 DIAGNOSIS — M48.062 LUMBAR STENOSIS WITH NEUROGENIC CLAUDICATION: ICD-10-CM

## 2024-07-09 RX ORDER — METHOCARBAMOL 500 MG/1
500 TABLET, FILM COATED ORAL 3 TIMES DAILY
Qty: 60 TABLET | Refills: 1 | Status: SHIPPED | OUTPATIENT
Start: 2024-07-09

## 2024-08-11 DIAGNOSIS — M48.062 LUMBAR STENOSIS WITH NEUROGENIC CLAUDICATION: ICD-10-CM

## 2024-08-12 RX ORDER — MELOXICAM 15 MG/1
15 TABLET ORAL
Qty: 60 TABLET | Refills: 1 | Status: SHIPPED | OUTPATIENT
Start: 2024-08-12

## 2024-08-14 DIAGNOSIS — M48.062 LUMBAR STENOSIS WITH NEUROGENIC CLAUDICATION: ICD-10-CM

## 2024-08-14 RX ORDER — METHOCARBAMOL 500 MG/1
500 TABLET, FILM COATED ORAL 3 TIMES DAILY
Qty: 60 TABLET | Refills: 1 | Status: SHIPPED | OUTPATIENT
Start: 2024-08-14

## 2024-12-19 DIAGNOSIS — M48.062 LUMBAR STENOSIS WITH NEUROGENIC CLAUDICATION: ICD-10-CM

## 2024-12-19 RX ORDER — MELOXICAM 15 MG/1
15 TABLET ORAL DAILY
Qty: 60 TABLET | Refills: 1 | Status: SHIPPED | OUTPATIENT
Start: 2024-12-19

## 2024-12-19 NOTE — TELEPHONE ENCOUNTER
PT has been taking 30mg of meloxicam instead of the 15mg prescribed. He is still hurting from spinal stenosis. Is seeing a chiropractor.   Asking for 30mg meloxicam even though he is not sure that it is working. Has been taking the increased dose for a month.

## 2024-12-19 NOTE — TELEPHONE ENCOUNTER
----- Message from Aura sent at 12/19/2024  1:46 PM CST -----  Type: General Call Back     Name of Caller:pt   Reason pt states that he needs to talk to someone about his  medicine   Would the patient rather a call back or a response via [facility]? Call   Best Call Back Number:[phone]   Additional Information:

## 2024-12-19 NOTE — TELEPHONE ENCOUNTER
Care Due:                  Date            Visit Type   Department     Provider  --------------------------------------------------------------------------------                                [facility] FAMILY                              PRIMARY      MEDICINE/INTERN  Last Visit: 03-      CARE ([facility])   [facility]         Donaldo Duke  Next Visit: None Scheduled  None         None Found                                                            Last  Test          Frequency    Reason                     Performed    Due Date  --------------------------------------------------------------------------------    Office Visit  12 months..  meloxicam................  03- 03-    CBC.........  12 months..  meloxicam................  03- 03-    CMP.........  12 months..  meloxicam, simvastatin...  03- 03-    Lipid Panel.  12 months..  simvastatin..............  03- 03-    [facility] Embedded Care Due Messages. Reference number: [id number].   12/19/2024 3:08:15 PM CST

## 2024-12-19 NOTE — TELEPHONE ENCOUNTER
PT informed of Dr Duke's recommendation.    Since he used too many, he is asking for a refill. He will go back down to the 15mg. He is paying out of pocket for the medication.

## 2024-12-31 DIAGNOSIS — I10 ESSENTIAL HYPERTENSION: ICD-10-CM

## 2024-12-31 NOTE — TELEPHONE ENCOUNTER
Refill Routing Note   Medication(s) are not appropriate for processing by [facility] for the following reason(s):        Required vitals abnormal    [facility] action(s):  Defer             Appointments  past 12m or future 3m with PCP    Date Provider   Last Visit   3/8/2024 Donaldo Duke MD   Next Visit   Visit date not found Donaldo Duke MD   ED visits in past 90 days: 0        Note composed:2:12 PM 12/31/2024

## 2024-12-31 NOTE — TELEPHONE ENCOUNTER
No care due was identified.  [facility] Embedded Care Due Messages. Reference number: [id number].   12/31/2024 8:03:39 AM CST

## 2025-01-01 RX ORDER — ATENOLOL 50 MG/1
50 TABLET ORAL
Qty: 90 TABLET | Refills: 3 | Status: SHIPPED | OUTPATIENT
Start: 2025-01-01

## 2025-02-18 ENCOUNTER — TELEPHONE (OUTPATIENT)
Dept: FAMILY MEDICINE | Facility: CLINIC | Age: 60
End: 2025-02-18
Payer: COMMERCIAL

## 2025-02-18 NOTE — TELEPHONE ENCOUNTER
----- Message from Bernardino sent at 2/18/2025  3:34 PM CST -----  Regarding: self  Type: Sooner Appointment RequestPatient is requesting a sooner appointment. Patient declined first available appointment listed as well as another facility and provider. Patient will not accept being placed on the waitlist and is requesting a message to be sent to the doctor.Name of caller:selfWhne is the first available appointment:unknown tempet blockedSymptoms:check upWould the patient rather a call back or a response via [facility] call back number:[phone] Information:

## 2025-03-12 DIAGNOSIS — E29.1 MALE HYPOGONADISM: ICD-10-CM

## 2025-03-12 RX ORDER — TESTOSTERONE CYPIONATE 200 MG/ML
200 INJECTION, SOLUTION INTRAMUSCULAR
Qty: 10 ML | Refills: 2 | Status: SHIPPED | OUTPATIENT
Start: 2025-03-12

## 2025-03-12 NOTE — TELEPHONE ENCOUNTER
Care Due:                  Date            Visit Type   Department     Provider  --------------------------------------------------------------------------------                                [facility]                              PRIMARY      MEDICINE/INTERN  Last Visit: 03-      CARE ([facility])   JONES Duke                              [facility]                              PRIMARY      MEDICINE/INTERN  Next Visit: 05-      CARE ([facility])   AL RENÉE Duke                                                            Last  Test          Frequency    Reason                     Performed    Due Date  --------------------------------------------------------------------------------    CBC.........  12 months..  meloxicam................  03- 03-    CMP.........  12 months..  meloxicam, simvastatin...  03- 03-    Lipid Panel.  12 months..  simvastatin..............  03- 03-    [facility] Embedded Care Due Messages. Reference number: [id number].   3/12/2025 10:59:40 AM CDT

## 2025-04-21 DIAGNOSIS — M48.062 LUMBAR STENOSIS WITH NEUROGENIC CLAUDICATION: ICD-10-CM

## 2025-04-21 NOTE — TELEPHONE ENCOUNTER
No care due was identified.  Health [facility] Embedded Care Due Messages. Reference number: [id number].   4/21/2025 11:04:52 AM CDT

## 2025-04-22 RX ORDER — MELOXICAM 15 MG/1
15 TABLET ORAL
Qty: 60 TABLET | Refills: 1 | Status: SHIPPED | OUTPATIENT
Start: 2025-04-22

## 2025-05-22 ENCOUNTER — OFFICE VISIT (OUTPATIENT)
Dept: FAMILY MEDICINE | Facility: CLINIC | Age: 60
End: 2025-05-22
Payer: COMMERCIAL

## 2025-05-22 VITALS
TEMPERATURE: 98 F | HEIGHT: 69 IN | WEIGHT: 186.5 LBS | BODY MASS INDEX: 27.62 KG/M2 | OXYGEN SATURATION: 99 % | SYSTOLIC BLOOD PRESSURE: 136 MMHG | DIASTOLIC BLOOD PRESSURE: 82 MMHG | HEART RATE: 70 BPM

## 2025-05-22 DIAGNOSIS — Z00.00 ANNUAL PHYSICAL EXAM: Primary | ICD-10-CM

## 2025-05-22 DIAGNOSIS — G89.29 CHRONIC MUSCULOSKELETAL PAIN: ICD-10-CM

## 2025-05-22 DIAGNOSIS — M79.18 CHRONIC MUSCULOSKELETAL PAIN: ICD-10-CM

## 2025-05-22 DIAGNOSIS — F32.A DEPRESSION, UNSPECIFIED DEPRESSION TYPE: ICD-10-CM

## 2025-05-22 DIAGNOSIS — E29.1 MALE HYPOGONADISM: ICD-10-CM

## 2025-05-22 PROCEDURE — 99999 PR PBB SHADOW E&M-EST. PATIENT-LVL IV: CPT | Mod: PBBFAC,,, | Performed by: FAMILY MEDICINE

## 2025-05-22 PROCEDURE — 99396 PREV VISIT EST AGE 40-64: CPT | Mod: S$GLB,,, | Performed by: FAMILY MEDICINE

## 2025-05-22 RX ORDER — TESTOSTERONE CYPIONATE 200 MG/ML
200 INJECTION, SOLUTION INTRAMUSCULAR WEEKLY
Qty: 4 ML | Refills: 5 | Status: SHIPPED | OUTPATIENT
Start: 2025-05-22

## 2025-05-22 RX ORDER — DULOXETIN HYDROCHLORIDE 60 MG/1
60 CAPSULE, DELAYED RELEASE ORAL DAILY
Qty: 90 CAPSULE | Refills: 3 | Status: SHIPPED | OUTPATIENT
Start: 2025-05-22

## 2025-05-22 NOTE — PROGRESS NOTES
"Chief Complaint   Patient presents with    Annual Exam       SUBJECTIVE:   Familia Mccarthy is a 59 y.o. male presenting for his annual checkup.   Current Medications[1]  Allergies: Patient has no known allergies.   Patient Active Problem List    Diagnosis Date Noted    Chronic musculoskeletal pain 01/28/2021    Depression 01/28/2021    Low testosterone in male 02/06/2018    Chronic pain of left knee and right knee, with past surgeries 08/10/2017    Hypertriglyceridemia 11/19/2014    Prediabetes 11/19/2014    Hypertension 07/07/2014    Hyperlipidemia 07/07/2014       ROS:  Feeling well. No dyspnea or chest pain on exertion. No abdominal pain, change in bowel habits, black or bloody stools. No urinary tract or prostatic symptoms. No neurological complaints.  Having a lot of joint and spine pain  Doesn't want surgery  OBJECTIVE:   The patient appears well, alert, oriented x 3, in no distress.   /82   Pulse 70   Temp 98.3 °F (36.8 °C) (Oral)   Ht 5' 9" (1.753 m)   Wt 84.6 kg (186 lb 8.2 oz)   SpO2 99%   BMI 27.54 kg/m²   Wt Readings from Last 5 Encounters:   05/22/25 84.6 kg (186 lb 8.2 oz)   06/27/24 81.7 kg (180 lb 1.9 oz)   05/10/24 81.7 kg (180 lb 1.9 oz)   04/18/24 78.9 kg (174 lb)   04/11/24 80 kg (176 lb 5.9 oz)       ENT normal.  Neck supple. No adenopathy or thyromegaly. BILLY. Lungs are clear, good air entry, no wheezes, rhonchi or rales. S1 and S2 normal, no murmurs, regular rate and rhythm. Abdomen is soft without tenderness, guarding, mass or organomegaly.  exam: deferred.  Extremities show no edema, normal peripheral pulses. Neurological is normal without focal findings.  Very bow legged  Has stiff antalgic gait  Slightly forward  ASSESSMENT:   1. Annual physical exam    2. Male hypogonadism    3. Depression, unspecified depression type    4. Chronic musculoskeletal pain          PLAN:   Counseled on age appropriate medical preventative services, including age appropriate cancer " screenings, over all nutritional health, need for a consistent exercise regimen and an over all push towards maintaining a vigorous and active lifestyle.  Counseled on age appropriate vaccines and discussed upcoming health care needs based on age/gender.  Spent time with patient counseling on need for a good patient/doctor relationship moving forward.  Discussed use of common OTC medications and supplements.  Discussed common dietary aids and use of caffeine and the need for good sleep hygiene and stress management.    Problem List Items Addressed This Visit       Chronic musculoskeletal pain    Relevant Medications    DULoxetine (CYMBALTA) 60 MG capsule    Depression    Relevant Medications    DULoxetine (CYMBALTA) 60 MG capsule     Other Visit Diagnoses         Annual physical exam    -  Primary    Relevant Medications    testosterone cypionate (DEPOTESTOTERONE CYPIONATE) 200 mg/mL injection    Other Relevant Orders    CBC Auto Differential    Comprehensive Metabolic Panel    Urinalysis, Reflex to Urine Culture Urine, Clean Catch    PSA, Screening    Hemoglobin A1C    Lipid Panel    TSH      Male hypogonadism        Relevant Medications    testosterone cypionate (DEPOTESTOTERONE CYPIONATE) 200 mg/mL injection    Other Relevant Orders    CBC Auto Differential    Comprehensive Metabolic Panel    Urinalysis, Reflex to Urine Culture Urine, Clean Catch    PSA, Screening    Hemoglobin A1C    Lipid Panel    TSH                   [1]   Current Outpatient Medications   Medication Sig Dispense Refill    ascorbic acid, vitamin C, (VITAMIN C) 1000 MG tablet Take 1,000 mg by mouth once daily.      atenoloL (TENORMIN) 50 MG tablet TAKE 1 TABLET BY MOUTH EVERY DAY 90 tablet 3    calcium carbonate (OS-MELISSA) 600 mg calcium (1,500 mg) Tab Take 2,000 mg by mouth once.      cyanocobalamin (VITAMIN B-12) 1000 MCG tablet Take 5,000 mcg by mouth once daily.      fish oil-omega-3 fatty acids 300-1,000 mg capsule Take by mouth once daily.   "    meloxicam (MOBIC) 15 MG tablet TAKE ONE TABLET BY MOUTH EVERY DAY 60 tablet 1    multivit-min/FA/lycopen/lutein (CENTRUM SILVER MEN ORAL) Take by mouth.      potassium chloride SA (K-DUR,KLOR-CON) 20 MEQ tablet Take 99 mEq by mouth 2 (two) times daily.      simvastatin (ZOCOR) 80 MG tablet TAKE ONE TABLET BY MOUTH EVERY EVENING 90 tablet 3    syringe with needle (BD INTEGRA SYRINGE) 3 mL 21 gauge x 1 1/2" Syrg Inject as directed 100 Syringe 0    DULoxetine (CYMBALTA) 60 MG capsule Take 1 capsule (60 mg total) by mouth once daily. 90 capsule 3    testosterone cypionate (DEPOTESTOTERONE CYPIONATE) 200 mg/mL injection Inject 1 mL (200 mg total) into the muscle once a week. 4 mL 5     No current facility-administered medications for this visit.     "

## 2025-05-23 ENCOUNTER — LAB VISIT (OUTPATIENT)
Dept: LAB | Facility: HOSPITAL | Age: 60
End: 2025-05-23
Payer: COMMERCIAL

## 2025-05-23 DIAGNOSIS — Z00.00 ANNUAL PHYSICAL EXAM: ICD-10-CM

## 2025-05-23 DIAGNOSIS — E29.1 MALE HYPOGONADISM: ICD-10-CM

## 2025-05-23 LAB
ABSOLUTE EOSINOPHIL (OHS): 0.45 K/UL
ABSOLUTE MONOCYTE (OHS): 0.57 K/UL (ref 0.3–1)
ABSOLUTE NEUTROPHIL COUNT (OHS): 4.67 K/UL (ref 1.8–7.7)
ALBUMIN SERPL BCP-MCNC: 4.1 G/DL (ref 3.5–5.2)
ALP SERPL-CCNC: 71 UNIT/L (ref 40–150)
ALT SERPL W/O P-5'-P-CCNC: 19 UNIT/L (ref 10–44)
ANION GAP (OHS): 7 MMOL/L (ref 8–16)
AST SERPL-CCNC: 21 UNIT/L (ref 11–45)
BASOPHILS # BLD AUTO: 0.07 K/UL
BASOPHILS NFR BLD AUTO: 0.9 %
BILIRUB SERPL-MCNC: 0.5 MG/DL (ref 0.1–1)
BILIRUB UR QL STRIP.AUTO: NEGATIVE
BUN SERPL-MCNC: 11 MG/DL (ref 6–20)
CALCIUM SERPL-MCNC: 9.2 MG/DL (ref 8.7–10.5)
CHLORIDE SERPL-SCNC: 108 MMOL/L (ref 95–110)
CHOLEST SERPL-MCNC: 136 MG/DL (ref 120–199)
CHOLEST/HDLC SERPL: 4.3 {RATIO} (ref 2–5)
CLARITY UR: CLEAR
CO2 SERPL-SCNC: 26 MMOL/L (ref 23–29)
COLOR UR AUTO: YELLOW
CREAT SERPL-MCNC: 0.9 MG/DL (ref 0.5–1.4)
ERYTHROCYTE [DISTWIDTH] IN BLOOD BY AUTOMATED COUNT: 13 % (ref 11.5–14.5)
GFR SERPLBLD CREATININE-BSD FMLA CKD-EPI: >60 ML/MIN/1.73/M2
GLUCOSE SERPL-MCNC: 131 MG/DL (ref 70–110)
GLUCOSE UR QL STRIP: NEGATIVE
HCT VFR BLD AUTO: 46.4 % (ref 40–54)
HDLC SERPL-MCNC: 32 MG/DL (ref 40–75)
HDLC SERPL: 23.5 % (ref 20–50)
HGB BLD-MCNC: 14.8 GM/DL (ref 14–18)
HGB UR QL STRIP: NEGATIVE
IMM GRANULOCYTES # BLD AUTO: 0.04 K/UL (ref 0–0.04)
IMM GRANULOCYTES NFR BLD AUTO: 0.5 % (ref 0–0.5)
KETONES UR QL STRIP: NEGATIVE
LDLC SERPL CALC-MCNC: 65.8 MG/DL (ref 63–159)
LEUKOCYTE ESTERASE UR QL STRIP: NEGATIVE
LYMPHOCYTES # BLD AUTO: 1.94 K/UL (ref 1–4.8)
MCH RBC QN AUTO: 31.3 PG (ref 27–31)
MCHC RBC AUTO-ENTMCNC: 31.9 G/DL (ref 32–36)
MCV RBC AUTO: 98 FL (ref 82–98)
NITRITE UR QL STRIP: NEGATIVE
NONHDLC SERPL-MCNC: 104 MG/DL
NUCLEATED RBC (/100WBC) (OHS): 0 /100 WBC
PH UR STRIP: 6 [PH]
PLATELET # BLD AUTO: 226 K/UL (ref 150–450)
PMV BLD AUTO: 10 FL (ref 9.2–12.9)
POTASSIUM SERPL-SCNC: 4.5 MMOL/L (ref 3.5–5.1)
PROT SERPL-MCNC: 7.6 GM/DL (ref 6–8.4)
PROT UR QL STRIP: NEGATIVE
PSA SERPL-MCNC: 2.75 NG/ML
RBC # BLD AUTO: 4.73 M/UL (ref 4.6–6.2)
RELATIVE EOSINOPHIL (OHS): 5.8 %
RELATIVE LYMPHOCYTE (OHS): 25.1 % (ref 18–48)
RELATIVE MONOCYTE (OHS): 7.4 % (ref 4–15)
RELATIVE NEUTROPHIL (OHS): 60.3 % (ref 38–73)
SODIUM SERPL-SCNC: 141 MMOL/L (ref 136–145)
SP GR UR STRIP: 1.01
T4 FREE SERPL-MCNC: 0.66 NG/DL (ref 0.71–1.51)
TRIGL SERPL-MCNC: 191 MG/DL (ref 30–150)
TSH SERPL-ACNC: 5.32 UIU/ML (ref 0.4–4)
UROBILINOGEN UR STRIP-ACNC: NEGATIVE EU/DL
WBC # BLD AUTO: 7.74 K/UL (ref 3.9–12.7)

## 2025-05-23 PROCEDURE — 36415 COLL VENOUS BLD VENIPUNCTURE: CPT | Mod: PO

## 2025-05-23 PROCEDURE — 83036 HEMOGLOBIN GLYCOSYLATED A1C: CPT

## 2025-05-23 PROCEDURE — 84153 ASSAY OF PSA TOTAL: CPT

## 2025-05-23 PROCEDURE — 85025 COMPLETE CBC W/AUTO DIFF WBC: CPT

## 2025-05-23 PROCEDURE — 82040 ASSAY OF SERUM ALBUMIN: CPT

## 2025-05-23 PROCEDURE — 80061 LIPID PANEL: CPT

## 2025-05-23 PROCEDURE — 81003 URINALYSIS AUTO W/O SCOPE: CPT

## 2025-05-23 PROCEDURE — 84443 ASSAY THYROID STIM HORMONE: CPT

## 2025-05-24 LAB
EAG (OHS): 120 MG/DL (ref 68–131)
HBA1C MFR BLD: 5.8 % (ref 4–5.6)

## 2025-06-18 DIAGNOSIS — E78.5 HYPERLIPIDEMIA, UNSPECIFIED HYPERLIPIDEMIA TYPE: ICD-10-CM

## 2025-06-18 RX ORDER — SIMVASTATIN 80 MG/1
80 TABLET, FILM COATED ORAL NIGHTLY
Qty: 90 TABLET | Refills: 3 | Status: SHIPPED | OUTPATIENT
Start: 2025-06-18

## 2025-06-18 NOTE — TELEPHONE ENCOUNTER
No care due was identified.  [facility] Embedded Care Due Messages. Reference number: [id number].   6/18/2025 8:05:10 AM CDT

## 2025-06-18 NOTE — TELEPHONE ENCOUNTER
Refill Decision Note   Familia Mccarthy  is requesting a refill authorization.  Brief Assessment and Rationale for Refill:  Approve     Medication Therapy Plan:         Comments:     Note composed:12:34 PM 06/18/2025

## 2025-08-06 ENCOUNTER — PATIENT MESSAGE (OUTPATIENT)
Dept: FAMILY MEDICINE | Facility: CLINIC | Age: 60
End: 2025-08-06
Payer: COMMERCIAL